# Patient Record
Sex: FEMALE | Race: WHITE | Employment: FULL TIME | URBAN - METROPOLITAN AREA
[De-identification: names, ages, dates, MRNs, and addresses within clinical notes are randomized per-mention and may not be internally consistent; named-entity substitution may affect disease eponyms.]

---

## 2022-03-09 ENCOUNTER — TRANSCRIBE ORDER (OUTPATIENT)
Dept: SCHEDULING | Age: 42
End: 2022-03-09

## 2022-03-09 DIAGNOSIS — Z12.31 ENCOUNTER FOR SCREENING MAMMOGRAM FOR MALIGNANT NEOPLASM OF BREAST: Primary | ICD-10-CM

## 2022-03-23 ENCOUNTER — HOSPITAL ENCOUNTER (OUTPATIENT)
Dept: MAMMOGRAPHY | Age: 42
Discharge: HOME | End: 2022-03-23
Attending: OBSTETRICS & GYNECOLOGY

## 2022-03-23 DIAGNOSIS — Z12.31 ENCOUNTER FOR SCREENING MAMMOGRAM FOR MALIGNANT NEOPLASM OF BREAST: ICD-10-CM

## 2022-03-31 ENCOUNTER — HOSPITAL ENCOUNTER (OUTPATIENT)
Dept: MAMMOGRAPHY | Age: 42
Discharge: HOME | End: 2022-03-31
Attending: OBSTETRICS & GYNECOLOGY
Payer: COMMERCIAL

## 2022-03-31 DIAGNOSIS — R92.8 ABNORMAL MAMMOGRAM: ICD-10-CM

## 2022-03-31 PROCEDURE — 77065 DX MAMMO INCL CAD UNI: CPT

## 2022-04-06 ENCOUNTER — HOSPITAL ENCOUNTER (OUTPATIENT)
Dept: MAMMOGRAPHY | Age: 42
Discharge: HOME | End: 2022-04-06
Attending: OBSTETRICS & GYNECOLOGY
Payer: COMMERCIAL

## 2022-04-06 VITALS
HEART RATE: 82 BPM | SYSTOLIC BLOOD PRESSURE: 125 MMHG | OXYGEN SATURATION: 100 % | DIASTOLIC BLOOD PRESSURE: 71 MMHG | TEMPERATURE: 97.1 F

## 2022-04-06 DIAGNOSIS — R92.8 ABNORMAL MAMMOGRAM OF RIGHT BREAST: ICD-10-CM

## 2022-04-06 PROCEDURE — 74011000250 HC RX REV CODE- 250: Performed by: OBSTETRICS & GYNECOLOGY

## 2022-04-06 PROCEDURE — 19081 BX BREAST 1ST LESION STRTCTC: CPT

## 2022-04-06 PROCEDURE — 74011250636 HC RX REV CODE- 250/636: Performed by: OBSTETRICS & GYNECOLOGY

## 2022-04-06 PROCEDURE — 88305 TISSUE EXAM BY PATHOLOGIST: CPT

## 2022-04-06 PROCEDURE — 77065 DX MAMMO INCL CAD UNI: CPT

## 2022-04-06 RX ORDER — LIDOCAINE HYDROCHLORIDE 10 MG/ML
5 INJECTION INFILTRATION; PERINEURAL
Status: COMPLETED | OUTPATIENT
Start: 2022-04-06 | End: 2022-04-06

## 2022-04-06 RX ORDER — LIDOCAINE HYDROCHLORIDE AND EPINEPHRINE 10; 10 MG/ML; UG/ML
10 INJECTION, SOLUTION INFILTRATION; PERINEURAL
Status: COMPLETED | OUTPATIENT
Start: 2022-04-06 | End: 2022-04-06

## 2022-04-06 RX ORDER — LIDOCAINE HYDROCHLORIDE AND EPINEPHRINE 10; 10 MG/ML; UG/ML
5 INJECTION, SOLUTION INFILTRATION; PERINEURAL
Status: COMPLETED | OUTPATIENT
Start: 2022-04-06 | End: 2022-04-06

## 2022-04-06 RX ADMIN — SODIUM CHLORIDE 250 ML: 900 INJECTION, SOLUTION INTRAVENOUS at 11:58

## 2022-04-06 RX ADMIN — LIDOCAINE HYDROCHLORIDE 3 ML: 10 INJECTION, SOLUTION INFILTRATION; PERINEURAL at 11:57

## 2022-04-06 RX ADMIN — LIDOCAINE HYDROCHLORIDE,EPINEPHRINE BITARTRATE 100 MG: 10; .01 INJECTION, SOLUTION INFILTRATION; PERINEURAL at 11:56

## 2022-04-06 RX ADMIN — LIDOCAINE HYDROCHLORIDE,EPINEPHRINE BITARTRATE 50 MG: 10; .01 INJECTION, SOLUTION INFILTRATION; PERINEURAL at 11:57

## 2022-04-14 NOTE — H&P (VIEW-ONLY)
Bridgette Bellamy MD, [facility], [address] [place] [address]  Mario Guzmán  Phone [phone]   Fax [phone]      Date of visit: 4/14/2022     Primary/Requesting provider: None    Chief Complaint   Patient presents with    New Patient     right breast ADH          HISTORY OF PRESENT ILLNESS  Jose Mehta is a 38 y.o. female. HPI  Patient is a 38 y.o. female who presents for discussion regarding her recent diagnosis of RIGHT ADH. She is asymptomatic. Her diagnosis was noted on her baseline mammogram a few weeks ago, prompting further evaluation. She has no personal history of breast concerns; her mother was diagnosed with stage 1 breast cancer 5 yrs ago at age 79. Medications:   No current outpatient medications on file. No current facility-administered medications for this visit. Allergies: No Known Allergies     Past History:  History reviewed. No pertinent past medical history.    Past Surgical History:   Procedure Laterality Date    HX BREAST BIOPSY Right 04/06/2022    Stereotactic Biopsy        Family and Social History:  Family History   Problem Relation Age of Onset    Breast Cancer Mother      Social History     Socioeconomic History    Marital status:      Spouse name: Not on file    Number of children: Not on file    Years of education: Not on file    Highest education level: Not on file   Occupational History    Not on file   Tobacco Use    Smoking status: Never Smoker    Smokeless tobacco: Never Used   Substance and Sexual Activity    Alcohol use: Not on file    Drug use: Not on file    Sexual activity: Not on file   Other Topics Concern    Not on file   Social History Narrative    Not on file     Social Determinants of Health     Financial Resource Strain:     Difficulty of Paying Living Expenses: Not on file   Food Insecurity:     Worried About Running Out of Food in the Last Year: Not on file    Alec of Food in the Last Year: Not on file   Transportation Needs:     Lack of Transportation (Medical): Not on file    Lack of Transportation (Non-Medical): Not on file   Physical Activity:     Days of Exercise per Week: Not on file    Minutes of Exercise per Session: Not on file   Stress:     Feeling of Stress : Not on file   Social Connections:     Frequency of Communication with Friends and Family: Not on file    Frequency of Social Gatherings with Friends and Family: Not on file    Attends Buddhist Services: Not on file    Active Member of [address] [place] [address] [employer] or Organizations: Not on file    Attends Club or Organization Meetings: Not on file    Marital Status: Not on file   Intimate Partner Violence:     Fear of Current or Ex-Partner: Not on file    Emotionally Abused: Not on file    Physically Abused: Not on file    Sexually Abused: Not on file   Housing Stability:     Unable to Pay for Housing in the Last Year: Not on file    Number of [place] in the Last Year: Not on file    Unstable Housing in the Last Year: Not on file       Review of Systems   Constitutional: Negative. HENT: Negative. Eyes: Negative. Respiratory: Negative. Cardiovascular: Negative. Gastrointestinal: Negative. Genitourinary: Negative. Musculoskeletal: Negative. Skin: Negative. Neurological: Negative. Endo/Heme/Allergies: Negative. Psychiatric/Behavioral: Negative. Physical Exam  Visit Vitals  BP (!) 128/94   Pulse 73   Ht 5' 8\" (1.727 m)   Wt 140 lb (63.5 kg)   LMP 04/11/2022   BMI 21.29 kg/m²     General Appearance: In no acute distress   Ears/Nose/Mouth/Throat:   Hearing grossly normal.         Neck: Supple. Chest:   Lungs clear to auscultation bilaterally. Cardiovascular:  Regular rate and rhythm, S1, S2 normal, no murmur. Abdomen:   Soft.    Extremities: No gross deformity    Neuro: alert and oriented x 3              BISI Results (most recent):  Results from Hospital Encounter encounter on 04/06/22    BISI BREAST RT SURG SPEC    Addendum 4/13/2022  2:31 PM  Addendum: Tolu Mendoza, accession number: [id number]  Date: 4/13/2022 Pathology was noted as A: Right breast calcifications at 9:00  position, core biopsy: Fibrocystic mastopathy having atypical ductal hyperplasia  with microcalcifications. See comment. Comment: Because ductal carcinoma in situ  is found in a small percentage of excisional biopsies following core biopsies  having atypical ductal hyperplasia, consideration should be given to excisional  biopsy, if clinically indicated. Results concordant with imaging. Pathology report was called to  Dr. Byron Hernandez office on 4/7/2022 by Marlyce Phoenix, RT([email or web address])(MONCHO). Patient was referred to Dr. Caron Bray and has an appointment scheduled with him on 4/14/2022. Narrative  Stereotactic Breast Biopsy    INDICATION: Developing calcification    PROCEDURE: After informed consent was obtained, the suspicious calcifications in  the lateral right breast were localized from a lateral approach. The overlying  skin was sterilely prepped and draped. 1% Lidocaine was used for superficial  anesthesia. Lidocaine with Epinephrine was used for deeper anesthesia. Using stereotactic guidance, an 9 gauge needle was advanced into the breast.  After good position of the needle was confirmed with stereotactic imaging, 2  samples were then obtained with vacuum assistance. A clip was left to jayne the  biopsy site. The needle was withdrawn and hemostasis was obtained. Patient  tolerated the procedure well. Specimen Radiograph:  Multiple calcifications are present in the stereotactic  specimen. Postprocedure Mammogram:  Postprocedure right mammograms show the biopsy clip in  expected location. Impression  Successful vacuum assisted stereotactic biopsy of calcifications in  the lateral right breast              ASSESSMENT and PLAN  Encounter Diagnoses   Name Primary?     Atypical ductal hyperplasia of right breast Yes    Family history of breast cancer in mother        Reviewed implications of ADH with pt and . If ADH is final diagnosis, referral to oncology to discuss risk reduction strategies will be recommended. Lumkpectomy/wide excision of the area recommended to r/o DCIS or invasive disease, found in a small percentage of cases. After discussion, we will proceed with  wire localized, right lumpectomy. We have discussed the technical details of the procedure(s) in detail. Risks reviewed include anesthetic risks, bleeding, infection, wound healing problems and cosmetic abnormalities, need for further surgical intervention depending on pathology reports. All questions are answered.

## 2022-04-25 VITALS — WEIGHT: 135 LBS | HEIGHT: 68 IN | BODY MASS INDEX: 20.46 KG/M2

## 2022-04-25 RX ORDER — MELATONIN
DAILY
COMMUNITY

## 2022-04-25 NOTE — PERIOP NOTES
Patient verified name and . Order for consent NOT found in EHR; patient verifies procedure. Type 1B surgery, phone assessment complete. Orders not received. Labs per surgeon: no orders received  Labs per anesthesia protocol: none    Patient answered medical/surgical history questions at their best of ability. All prior to admission medications documented in [facility]. Patient instructed to take the following medications the day of surgery according to anesthesia guidelines with a small sip of water: NONE. On the day before surgery please take Acetaminophen 1000mg in the morning and then again before bed. You may substitute for Tylenol 650 mg. Hold all vitamins, supplements, herbals 7 days prior to surgery and NSAIDS/ASA 5 days prior to surgery. Patient instructed on the following:    > Arrive at [facility], time of arrival to be called the day before by 1700  > NPO after midnight including gum, mints, and ice chips  > Responsible adult must drive patient to the hospital, stay during surgery, and patient will need supervision 24 hours after anesthesia  > Use anti-bacterial soap in shower the night before surgery and on the morning of surgery  > All piercings must be removed prior to arrival.    > Leave all valuables (money and jewelry) at home but bring insurance card and ID on DOS.   > You may be required to pay a deductible or co-pay on the day of your procedure. You can pre-pay by calling [phone] if your surgery is at the [facility] or [phone] if your surgery is at the [facility]. > Do not wear deodorant,  make-up, nail polish, lotions, cologne, perfumes, powders, or oil on skin. Artificial nails are not permitted.

## 2022-04-28 ENCOUNTER — HOSPITAL ENCOUNTER (OUTPATIENT)
Dept: SURGERY | Age: 42
Discharge: HOME | End: 2022-04-28

## 2022-05-04 ENCOUNTER — ANESTHESIA EVENT (OUTPATIENT)
Dept: SURGERY | Age: 42
End: 2022-05-04
Payer: COMMERCIAL

## 2022-05-05 ENCOUNTER — APPOINTMENT (OUTPATIENT)
Dept: MAMMOGRAPHY | Age: 42
End: 2022-05-05
Attending: SURGERY
Payer: COMMERCIAL

## 2022-05-05 ENCOUNTER — ANESTHESIA (OUTPATIENT)
Dept: SURGERY | Age: 42
End: 2022-05-05
Payer: COMMERCIAL

## 2022-05-05 ENCOUNTER — HOSPITAL ENCOUNTER (OUTPATIENT)
Age: 42
Setting detail: OUTPATIENT SURGERY
Discharge: HOME | End: 2022-05-05
Attending: SURGERY | Admitting: SURGERY
Payer: COMMERCIAL

## 2022-05-05 VITALS
HEART RATE: 78 BPM | WEIGHT: 139.8 LBS | RESPIRATION RATE: 16 BRPM | SYSTOLIC BLOOD PRESSURE: 132 MMHG | OXYGEN SATURATION: 100 % | DIASTOLIC BLOOD PRESSURE: 78 MMHG | BODY MASS INDEX: 21.19 KG/M2 | TEMPERATURE: 97.5 F | HEIGHT: 68 IN

## 2022-05-05 DIAGNOSIS — Z80.3 FAMILY HISTORY OF BREAST CANCER IN MOTHER: ICD-10-CM

## 2022-05-05 DIAGNOSIS — C50.919 BREAST CANCER (HCC): ICD-10-CM

## 2022-05-05 DIAGNOSIS — N60.91 ATYPICAL DUCTAL HYPERPLASIA OF RIGHT BREAST: ICD-10-CM

## 2022-05-05 LAB — HCG UR QL: NEGATIVE

## 2022-05-05 PROCEDURE — 74011000250 HC RX REV CODE- 250: Performed by: SURGERY

## 2022-05-05 PROCEDURE — 74011250636 HC RX REV CODE- 250/636: Performed by: ANESTHESIOLOGY

## 2022-05-05 PROCEDURE — 81025 URINE PREGNANCY TEST: CPT

## 2022-05-05 PROCEDURE — 19125 EXCISION BREAST LESION: CPT | Performed by: SURGERY

## 2022-05-05 PROCEDURE — 77030002996 HC SUT SLK J&J -A: Performed by: SURGERY

## 2022-05-05 PROCEDURE — 88307 TISSUE EXAM BY PATHOLOGIST: CPT

## 2022-05-05 PROCEDURE — 74011000250 HC RX REV CODE- 250: Performed by: NURSE ANESTHETIST, CERTIFIED REGISTERED

## 2022-05-05 PROCEDURE — C1819 TISSUE LOCALIZATION-EXCISION: HCPCS

## 2022-05-05 PROCEDURE — 74011250636 HC RX REV CODE- 250/636: Performed by: NURSE ANESTHETIST, CERTIFIED REGISTERED

## 2022-05-05 PROCEDURE — 76060000033 HC ANESTHESIA 1 TO 1.5 HR: Performed by: SURGERY

## 2022-05-05 PROCEDURE — 76010000138 HC OR TIME 0.5 TO 1 HR: Performed by: SURGERY

## 2022-05-05 PROCEDURE — 77030040361 HC SLV COMPR DVT MDII -B: Performed by: SURGERY

## 2022-05-05 PROCEDURE — 2709999900 HC NON-CHARGEABLE SUPPLY: Performed by: SURGERY

## 2022-05-05 PROCEDURE — 77030010509 HC AIRWY LMA MSK TELE -A: Performed by: ANESTHESIOLOGY

## 2022-05-05 PROCEDURE — 77030031139 HC SUT VCRL2 J&J -A: Performed by: SURGERY

## 2022-05-05 PROCEDURE — 76210000020 HC REC RM PH II FIRST 0.5 HR: Performed by: SURGERY

## 2022-05-05 PROCEDURE — 74011250636 HC RX REV CODE- 250/636: Performed by: SURGERY

## 2022-05-05 PROCEDURE — 77030010512 HC APPL CLP LIG J&J -C: Performed by: SURGERY

## 2022-05-05 PROCEDURE — 76210000006 HC OR PH I REC 0.5 TO 1 HR: Performed by: SURGERY

## 2022-05-05 PROCEDURE — 77030031304 HC WAVWGD EIGR DISP INVO -D: Performed by: SURGERY

## 2022-05-05 PROCEDURE — 74011250637 HC RX REV CODE- 250/637: Performed by: ANESTHESIOLOGY

## 2022-05-05 RX ORDER — HYDROCODONE BITARTRATE AND ACETAMINOPHEN 5; 325 MG/1; MG/1
1 TABLET ORAL
Qty: 10 TABLET | Refills: 0 | Status: SHIPPED | OUTPATIENT
Start: 2022-05-05 | End: 2022-05-10

## 2022-05-05 RX ORDER — SODIUM CHLORIDE, SODIUM LACTATE, POTASSIUM CHLORIDE, CALCIUM CHLORIDE 600; 310; 30; 20 MG/100ML; MG/100ML; MG/100ML; MG/100ML
75 INJECTION, SOLUTION INTRAVENOUS CONTINUOUS
Status: DISCONTINUED | OUTPATIENT
Start: 2022-05-05 | End: 2022-05-05 | Stop reason: HOSPADM

## 2022-05-05 RX ORDER — FENTANYL CITRATE 50 UG/ML
INJECTION, SOLUTION INTRAMUSCULAR; INTRAVENOUS AS NEEDED
Status: DISCONTINUED | OUTPATIENT
Start: 2022-05-05 | End: 2022-05-05 | Stop reason: HOSPADM

## 2022-05-05 RX ORDER — CEFAZOLIN SODIUM/WATER 2 G/20 ML
2 SYRINGE (ML) INTRAVENOUS
Status: COMPLETED | OUTPATIENT
Start: 2022-05-05 | End: 2022-05-05

## 2022-05-05 RX ORDER — LIDOCAINE HYDROCHLORIDE 20 MG/ML
INJECTION, SOLUTION EPIDURAL; INFILTRATION; INTRACAUDAL; PERINEURAL AS NEEDED
Status: DISCONTINUED | OUTPATIENT
Start: 2022-05-05 | End: 2022-05-05 | Stop reason: HOSPADM

## 2022-05-05 RX ORDER — OXYCODONE AND ACETAMINOPHEN 5; 325 MG/1; MG/1
1 TABLET ORAL AS NEEDED
Status: DISCONTINUED | OUTPATIENT
Start: 2022-05-05 | End: 2022-05-05 | Stop reason: HOSPADM

## 2022-05-05 RX ORDER — MIDAZOLAM HYDROCHLORIDE 1 MG/ML
2 INJECTION, SOLUTION INTRAMUSCULAR; INTRAVENOUS
Status: DISCONTINUED | OUTPATIENT
Start: 2022-05-05 | End: 2022-05-05 | Stop reason: HOSPADM

## 2022-05-05 RX ORDER — PROPOFOL 10 MG/ML
INJECTION, EMULSION INTRAVENOUS AS NEEDED
Status: DISCONTINUED | OUTPATIENT
Start: 2022-05-05 | End: 2022-05-05 | Stop reason: HOSPADM

## 2022-05-05 RX ORDER — LIDOCAINE HYDROCHLORIDE 10 MG/ML
0.1 INJECTION INFILTRATION; PERINEURAL AS NEEDED
Status: DISCONTINUED | OUTPATIENT
Start: 2022-05-05 | End: 2022-05-05 | Stop reason: HOSPADM

## 2022-05-05 RX ORDER — LIDOCAINE HYDROCHLORIDE 10 MG/ML
5 INJECTION INFILTRATION; PERINEURAL
Status: COMPLETED | OUTPATIENT
Start: 2022-05-05 | End: 2022-05-05

## 2022-05-05 RX ORDER — ACETAMINOPHEN 500 MG
1000 TABLET ORAL ONCE
Status: COMPLETED | OUTPATIENT
Start: 2022-05-05 | End: 2022-05-05

## 2022-05-05 RX ORDER — BUPIVACAINE HYDROCHLORIDE 2.5 MG/ML
INJECTION, SOLUTION EPIDURAL; INFILTRATION; INTRACAUDAL AS NEEDED
Status: DISCONTINUED | OUTPATIENT
Start: 2022-05-05 | End: 2022-05-05 | Stop reason: HOSPADM

## 2022-05-05 RX ORDER — DEXAMETHASONE SODIUM PHOSPHATE 100 MG/10ML
INJECTION INTRAMUSCULAR; INTRAVENOUS AS NEEDED
Status: DISCONTINUED | OUTPATIENT
Start: 2022-05-05 | End: 2022-05-05 | Stop reason: HOSPADM

## 2022-05-05 RX ORDER — NALOXONE HYDROCHLORIDE 0.4 MG/ML
0.2 INJECTION, SOLUTION INTRAMUSCULAR; INTRAVENOUS; SUBCUTANEOUS AS NEEDED
Status: DISCONTINUED | OUTPATIENT
Start: 2022-05-05 | End: 2022-05-05 | Stop reason: HOSPADM

## 2022-05-05 RX ORDER — SODIUM CHLORIDE 0.9 % (FLUSH) 0.9 %
5-40 SYRINGE (ML) INJECTION EVERY 8 HOURS
Status: DISCONTINUED | OUTPATIENT
Start: 2022-05-05 | End: 2022-05-05 | Stop reason: HOSPADM

## 2022-05-05 RX ORDER — ONDANSETRON 2 MG/ML
INJECTION INTRAMUSCULAR; INTRAVENOUS AS NEEDED
Status: DISCONTINUED | OUTPATIENT
Start: 2022-05-05 | End: 2022-05-05 | Stop reason: HOSPADM

## 2022-05-05 RX ORDER — MIDAZOLAM HYDROCHLORIDE 1 MG/ML
INJECTION, SOLUTION INTRAMUSCULAR; INTRAVENOUS AS NEEDED
Status: DISCONTINUED | OUTPATIENT
Start: 2022-05-05 | End: 2022-05-05 | Stop reason: HOSPADM

## 2022-05-05 RX ORDER — SODIUM CHLORIDE 0.9 % (FLUSH) 0.9 %
5-40 SYRINGE (ML) INJECTION AS NEEDED
Status: DISCONTINUED | OUTPATIENT
Start: 2022-05-05 | End: 2022-05-05 | Stop reason: HOSPADM

## 2022-05-05 RX ORDER — HYDROMORPHONE HYDROCHLORIDE 2 MG/ML
0.5 INJECTION, SOLUTION INTRAMUSCULAR; INTRAVENOUS; SUBCUTANEOUS
Status: DISCONTINUED | OUTPATIENT
Start: 2022-05-05 | End: 2022-05-05 | Stop reason: HOSPADM

## 2022-05-05 RX ORDER — EPHEDRINE SULFATE/0.9% NACL/PF 50 MG/5 ML
SYRINGE (ML) INTRAVENOUS AS NEEDED
Status: DISCONTINUED | OUTPATIENT
Start: 2022-05-05 | End: 2022-05-05 | Stop reason: HOSPADM

## 2022-05-05 RX ADMIN — DEXAMETHASONE SODIUM PHOSPHATE 5 MG: 10 INJECTION INTRAMUSCULAR; INTRAVENOUS at 13:31

## 2022-05-05 RX ADMIN — LIDOCAINE HYDROCHLORIDE 2 ML: 10 INJECTION, SOLUTION INFILTRATION; PERINEURAL at 11:52

## 2022-05-05 RX ADMIN — Medication 10 MG: at 13:47

## 2022-05-05 RX ADMIN — MIDAZOLAM 2 MG: 1 INJECTION INTRAMUSCULAR; INTRAVENOUS at 13:07

## 2022-05-05 RX ADMIN — SODIUM CHLORIDE, SODIUM LACTATE, POTASSIUM CHLORIDE, AND CALCIUM CHLORIDE: 600; 310; 30; 20 INJECTION, SOLUTION INTRAVENOUS at 13:59

## 2022-05-05 RX ADMIN — FENTANYL CITRATE 50 MCG: 50 INJECTION INTRAMUSCULAR; INTRAVENOUS at 13:30

## 2022-05-05 RX ADMIN — PROPOFOL 200 MG: 10 INJECTION, EMULSION INTRAVENOUS at 13:23

## 2022-05-05 RX ADMIN — LIDOCAINE HYDROCHLORIDE 60 MG: 20 INJECTION, SOLUTION EPIDURAL; INFILTRATION; INTRACAUDAL; PERINEURAL at 13:23

## 2022-05-05 RX ADMIN — Medication 10 MG: at 13:59

## 2022-05-05 RX ADMIN — ONDANSETRON 4 MG: 2 INJECTION INTRAMUSCULAR; INTRAVENOUS at 13:23

## 2022-05-05 RX ADMIN — SODIUM CHLORIDE, SODIUM LACTATE, POTASSIUM CHLORIDE, AND CALCIUM CHLORIDE 75 ML/HR: 600; 310; 30; 20 INJECTION, SOLUTION INTRAVENOUS at 11:00

## 2022-05-05 RX ADMIN — FENTANYL CITRATE 25 MCG: 50 INJECTION INTRAMUSCULAR; INTRAVENOUS at 13:38

## 2022-05-05 RX ADMIN — Medication 2 G: at 13:15

## 2022-05-05 RX ADMIN — ACETAMINOPHEN 1000 MG: 500 TABLET, FILM COATED ORAL at 10:25

## 2022-05-05 RX ADMIN — FENTANYL CITRATE 25 MCG: 50 INJECTION INTRAMUSCULAR; INTRAVENOUS at 13:39

## 2022-05-05 RX ADMIN — OXYCODONE HYDROCHLORIDE AND ACETAMINOPHEN 1 TABLET: 5; 325 TABLET ORAL at 14:21

## 2022-05-05 NOTE — ANESTHESIA PREPROCEDURE EVALUATION
Relevant Problems   No relevant active problems       Anesthetic History   No history of anesthetic complications            Review of Systems / Medical History  Patient summary reviewed and pertinent labs reviewed    Pulmonary  Within defined limits                 Neuro/Psych   Within defined limits           Cardiovascular                  Exercise tolerance: >4 METS     GI/Hepatic/Renal  Within defined limits              Endo/Other  Within defined limits           Other Findings              Physical Exam    Airway  Mallampati: I  TM Distance: 4 - 6 cm  Neck ROM: normal range of motion   Mouth opening: Normal     Cardiovascular    Rhythm: regular  Rate: normal         Dental  No notable dental hx       Pulmonary  Breath sounds clear to auscultation               Abdominal  GI exam deferred       Other Findings            Anesthetic Plan    ASA: 2  Anesthesia type: general          Induction: Intravenous  Anesthetic plan and risks discussed with: Patient

## 2022-05-05 NOTE — ANESTHESIA POSTPROCEDURE EVALUATION
Procedure(s):  RIGHT BREAST LUMPECTOMY/PARTIAL  RIGHT BREAST NEEDLE LOCALIZED BIOPSY.     general    Anesthesia Post Evaluation      Multimodal analgesia: multimodal analgesia used between 6 hours prior to anesthesia start to PACU discharge  Patient location during evaluation: PACU  Patient participation: complete - patient participated  Level of consciousness: awake and alert  Pain management: adequate  Airway patency: patent  Anesthetic complications: no  Cardiovascular status: acceptable  Respiratory status: acceptable  Hydration status: acceptable  Post anesthesia nausea and vomiting:  none  Final Post Anesthesia Temperature Assessment:  Normothermia (36.0-37.5 degrees C)      INITIAL Post-op Vital signs:   Vitals Value Taken Time   /68 05/05/22 1434   Temp 36.4 °C (97.5 °F) 05/05/22 1414   Pulse 80 05/05/22 1434   Resp 16 05/05/22 1434   SpO2 100 % 05/05/22 1434

## 2022-05-05 NOTE — DISCHARGE INSTRUCTIONS
Carolann Nolan M.D.  [phone]    Instructions following Breast Surgery (lumpectomy, partial mastectomy)    ACTIVITY:   Try to take a few short walks with help around the house later today. It is very important to take short walks to avoid blood clots and pneumonia.  You may be light-headed or sleepy from anesthesia, so be careful going up and down stairs.  Avoid any activity that involves lifting your operative-side arm above your head until your follow-up appointment. We suggest wearing a tight-fitting bra (with or without a wire) continuously for the next 48 hours to minimize motion of the breast.    DIET:   Start with clear, non-carbonated liquids when you get home (sugar-free if you are diabetic), such as Gatorade, chicken broth, etc., and you may advance to regular foods as you feel able. PAIN:   You will be given a prescription for pain medication.  Try to take the pain medication with food, even a few crackers.  You may also use Tylenol, Motrin, Advil, or Aleve instead of the prescription pain medication. Do no take Tylenol and the prescription pain medication within 3 hours of each other.  URINARY RETENTION: If you are unable to empty your bladder within 6-8 hours after returning home, please go to your nearest Emergency Department or Urgent Care for urinary catheterization. WOUND CARE:   Please keep the dressing dry and intact for 3 days after surgery. You may then remove the tape and gauze;  at this time it is fine to get the incision wet,  The steri-strips will probably remain on your skin for several more days.  You may bathe prior to removing the dressing as long as the dressing remains dry   It is not uncommon for the breast to have a bruised appearance in the region of the surgery; this will clear over several days.  Incisions will sometimes develop redness around them as well as a hard lumpy feel.  If this area of redness continues to get larger, please call the office. FOLLOW UP:   Your follow-up appointment is usually made when your surgery is arranged. Please call the office if you are not sure of this appointment. CALL THE DOCTOR IF:   You have a temperature higher than 101.5° Fahrenheit for more than 6 hours.  You have severe nausea or vomiting.  You develop increasing redness or signs of infection at the incision. Continue home medications as previously prescribed. MEDICATION INTERACTION:  During your procedure you potentially received a medication or medications which may reduce the effectiveness of oral contraceptives. Please consider other forms of contraception for 1 month following your procedure if you are currently using oral contraceptives as your primary form of birth control. In addition to this, we recommend continuing your oral contraceptive as prescribed, unless otherwise instructed by your physician, during this time    After general anesthesia or intravenous sedation, for 24 hours or while taking prescription Narcotics:  · Limit your activities  · A responsible adult needs to be with you for the next 24 hours  · Do not drive and operate hazardous machinery  · Do not make important personal or business decisions  · Do not drink alcoholic beverages  · If you have not urinated within 8 hours after discharge, please contact your surgeon on call. · If you have sleep apnea and have a CPAP machine, please use it for all naps and sleeping. · Please use caution when taking narcotics and any of your home medications that may cause drowsiness. *  Please give a list of your current medications to your Primary Care Provider. *  Please update this list whenever your medications are discontinued, doses are      changed, or new medications (including over-the-counter products) are added. *  Please carry medication information at all times in case of emergency situations.     These are general instructions for a healthy lifestyle:  No smoking/ No tobacco products/ Avoid exposure to second hand smoke  Surgeon General's Warning:  Quitting smoking now greatly reduces serious risk to your health. Obesity, smoking, and sedentary lifestyle greatly increases your risk for illness  A healthy diet, regular physical exercise & weight monitoring are important for maintaining a healthy lifestyle    You may be retaining fluid if you have a history of heart failure or if you experience any of the following symptoms:  Weight gain of 3 pounds or more overnight or 5 pounds in a week, increased swelling in our hands or feet or shortness of breath while lying flat in bed. Please call your doctor as soon as you notice any of these symptoms; do not wait until your next office visit.

## 2022-05-05 NOTE — INTERVAL H&P NOTE
Update History & Physical    The Patient's History and Physical was reviewed with the patient and I examined the patient. There was no change. The surgical site was confirmed by the patient and me. Plan:  The risk, benefits, expected outcome, and alternative to the recommended procedure have been discussed with the patient. Patient understands and wants to proceed with the procedure.     Electronically signed by Claudia Lawson MD on 5/5/2022 at 1:06 PM

## 2022-05-05 NOTE — OP NOTES
Operative Note    Date of Surgery: 5/5/2022    Preoperative Diagnosis: Benign mammary dysplasia of right breast [N60.91]     Postoperative Diagnosis: Benign mammary dysplasia of right breast [N60.91]     Surgeon(s) and Role:  Jose Ramon Hyman MD - Primary      Anesthesia: General    Estimated Blood Loss: <10cc    Procedure:   Procedure(s):  RIGHT BREAST LUMPECTOMY/PARTIAL  RIGHT BREAST NEEDLE LOCALIZED BIOPSY    Indications:  As detailed in the H&P. Procedure in Detail:  Prior to the operative procedure,  Rosalba Ernst underwent wire localization to the Right breast in radiology. Films were reviewed. The patient was then brought to the operating room and placed on the table in a supine position with adequate padding to all pressure points and the arm extended laterally. After induction of anesthesia, the operative site was prepped and draped in the usual fashion. A radial, 7:00 incision was made in the area of the wire. This was carried down into the subcutaneous level and  small superior and inferior flaps were created sharply. Dissection then extended down the wire to the depth of interest where the area was excised circumferentially, marked in the usual manner, and was imaged for evaluation and confirmation of wire removal and clip capture, with calcs confirmed in the specimen by radiology. The cavity was irrigated and  hemostasis was achieved with cautery. The area was infiltrated with local and closed with interrupted subcutaneous 3-0 Vicryl and 4-0 Vicryl subcuticular sutures. Steri-Strips were applied to the wound. A gauze pressure dressing was applied to the breast. Sponge and needle counts were correct times two. The patient tolerated the procedure well and was transferred to recovery room in stable condition.                Specimens:   ID Type Source Tests Collected by Time Destination   1 : Right breast lumpectomy Fresh Breast  Chantal Stevens MD 5/5/2022 8117 Pathology Signed By: Allegra Benjamin MD     May 5, 2022

## 2022-07-19 NOTE — PROGRESS NOTES
New Patient Abstract    Reason for Referral: right breast ADH    Referring Provider:  Ingrid Fraser MD    Primary Care Provider: Not on file    Family History of Cancer/Hematologic Disorders: Mother with breast cancer    Presenting Symptoms: abnormal screening mammogram    Narrative with recent with Results/Procedures/Biopsies and Dates completed:   Ms. Pretty Vogt is a 57-year-old  female who reports to have never used tobacco products. She repots use of alcohol and drug substance use is not on file. Her medical history is not on file. Her surgical history reports as right breast biopsy and right breast lumpectomy. In March 2022, Ms. Pretty Vogt had her baseline mammogram that reported a calcification in her right breast. On 3/31/22 she had a right breast diagnostic mammogram that reported a right breast indeterminate calcification. On 4/6/22 she underwent a core needle biopsy of the right breast. The pathology reported fibrocystic mastopathy having atypical ductal hyperplasia with microcalcifications. On 4/14/22, Ms. Pretty Vogt saw Dr. Radha Shaikh for surgical consult. The recommendation was for right breast lumpectomy. On 5/5/22 she underwent a right breast partial lumpectomy. The pathology reported benign breast tissue with features of fibrocystic mastopathy. A referral was placed to medical oncology for consideration of risk reduction strategies given her no residual ADH however history of mother with breast cancer. 3/24/22 Bilateral Digital Screening Mammogram (Initial/baseline)  Impression   Calcification in the posterior lateral right breast.  Magnification   views recommended     3/31/22 Right Breast Diagnostic Mammogram  FINDINGS:        Mammogram: Digital diagnostic mammography was performed, and is interpreted in   conjunction with a computer assisted detection (CAD) system. Additional right mediolateral and magnification views were performed.  In the   posterior lateral breast at 9:00 6 cm from nipple there is a subcentimeter   grouping of pleomorphic calcifications with suggestion of some fine linear and   branching elements, of moderate suspicion. Biopsy is warranted. Elsewhere   scattered in the right breast are a few tiny faint typically benign   calcifications. No additional abnormal cluster is seen, and there is no evidence   of a discrete mass or architectural distortion. Impression   Right posterior 9:00 indeterminate calcifications. 4/6/22 Surgical Pathology  DIAGNOSIS       A:  \"RIGHT BREAST CALCIFICATIONS AT 9:00 POSITION, CORE BIOPSY\":        FIBROCYSTIC MASTOPATHY HAVING ATYPICAL DUCTAL HYPERPLASIA WITH   MICROCALCIFICATIONS. SEE COMMENT. COMMENT:  BECAUSE DUCTAL CARCINOMA IN SITU IS FOUND IN A SMALL PERCENTAGE   OF EXCISIONAL BIOPSIES FOLLOWING CORE BIOPSIES HAVING ATYPICAL DUCTAL   HYPERPLASIA, CONSIDERATION SHOULD BE GIVEN TO EXCISIONAL BIOPSY, IF   CLINICALLY INDICATED.    5/5/22 Surgical Pathology  DIAGNOSIS          \"RIGHT BREAST LUMPECTOMY\":             BENIGN BREAST TISSUE WITH FEATURES OF FIBROCYSTIC MASTOPATHY. BIOPSY SITE.     Notes from Referring Provider: n/a    Other Pertinent Information: n/a    Presented at Tumor Board: Yes, Dr Garrett Potter on 5/12/22

## 2022-08-23 ENCOUNTER — OFFICE VISIT (OUTPATIENT)
Dept: ONCOLOGY | Age: 42
End: 2022-08-23
Payer: COMMERCIAL

## 2022-08-23 VITALS
HEIGHT: 68 IN | WEIGHT: 143.6 LBS | SYSTOLIC BLOOD PRESSURE: 118 MMHG | HEART RATE: 67 BPM | OXYGEN SATURATION: 98 % | RESPIRATION RATE: 22 BRPM | BODY MASS INDEX: 21.76 KG/M2 | DIASTOLIC BLOOD PRESSURE: 74 MMHG | TEMPERATURE: 98.8 F

## 2022-08-23 DIAGNOSIS — N60.91 ATYPICAL DUCTAL HYPERPLASIA OF RIGHT BREAST: Primary | ICD-10-CM

## 2022-08-23 DIAGNOSIS — Z80.3 FAMILY HISTORY OF BREAST CANCER: ICD-10-CM

## 2022-08-23 PROCEDURE — 99203 OFFICE O/P NEW LOW 30 MIN: CPT | Performed by: INTERNAL MEDICINE

## 2022-08-23 ASSESSMENT — PATIENT HEALTH QUESTIONNAIRE - PHQ9
SUM OF ALL RESPONSES TO PHQ QUESTIONS 1-9: 0
SUM OF ALL RESPONSES TO PHQ9 QUESTIONS 1 & 2: 0
SUM OF ALL RESPONSES TO PHQ QUESTIONS 1-9: 0
2. FEELING DOWN, DEPRESSED OR HOPELESS: 0
1. LITTLE INTEREST OR PLEASURE IN DOING THINGS: 0

## 2022-08-23 NOTE — PROGRESS NOTES
[facility]: Office Visit New Patient H & P    Chief Complaint:    Chief Complaint   Patient presents with    New Patient         History of Present Illness:  Ms. Neha Kaba is a 38 y.o. female who presents today for evaluation regarding ADH and breast cancer risk reduction. In March 2022, Ms. Neha Kaba had her first mammogram for baseline, that reported a calcification in her right breast. On 3/31/22 she had a right breast diagnostic mammogram that confirmed an indeterminate calcification. On 4/6/22 she underwent a core needle biopsy of the right breast. The pathology reported fibrocystic mastopathy having atypical ductal hyperplasia with microcalcifications. On 4/14/22, Ms. Neha Kaba saw Dr. Cheyenne Khan for surgical consult. The recommendation was for right breast lumpectomy. On 5/5/22 she underwent a right breast partial lumpectomy. The pathology reported benign breast tissue with features of fibrocystic mastopathy, no residual ADH. A referral was placed to medical oncology for consideration of risk reduction strategies. Of note, she has a family history of a mother with post-menopausal breast cancer, she apparently has just completed her 5 years of endocrine therapy and remains ARACELI. Also of note, Ms. Neha Kaba is currently undergoing fertility treatments in order to conceive. She feels well with no complaints currently. Review of Systems:  Constitutional: Negative. HENT: Negative. Eyes: Negative. Respiratory: Negative. Cardiovascular: Negative. Gastrointestinal: Negative. Genitourinary: Negative. Musculoskeletal: Negative. Skin: Negative. Neurological: Negative. Endo/Heme/Allergies: Negative. Psychiatric/Behavioral: Negative. All other systems reviewed and are negative. Not on File  No past medical history on file.   Past Surgical History:   Procedure Laterality Date    BREAST BIOPSY Right 04/06/2022    Stereotactic Biopsy    BREAST BIOPSY Right 5/5/2022    RIGHT BREAST NEEDLE LOCALIZED BIOPSY performed by Francis Dang MD at [address] [facility] LUMPECTOMY Right 5/5/2022    RIGHT BREAST LUMPECTOMY/PARTIAL performed by Francis Dang MD at [address] [facility] LUMPECTOMY Right 05/05/2022    ADH    SHAYLEE STEROTACTIC LOC BREAST BIOPSY RIGHT Right 04/06/2022    SHAYLEE STEROTACTIC LOC BREAST BIOPSY RIGHT 4/6/2022 [facility]    SINUS SURGERY Bilateral      Family History   Problem Relation Age of Onset    Breast Cancer Mother      Social History     Socioeconomic History    Marital status:      Spouse name: Not on file    Number of children: Not on file    Years of education: Not on file    Highest education level: Not on file   Occupational History    Not on file   Tobacco Use    Smoking status: Never    Smokeless tobacco: Never   Substance and Sexual Activity    Alcohol use: Yes    Drug use: Not on file    Sexual activity: Not on file   Other Topics Concern    Not on file   Social History Narrative    Not on file     Social Determinants of Health     Financial Resource Strain: Not on file   Food Insecurity: Not on file   Transportation Needs: Not on file   Physical Activity: Not on file   Stress: Not on file   Social Connections: Not on file   Intimate Partner Violence: Not on file   Housing Stability: Not on file     No current outpatient medications on file. No current facility-administered medications for this visit. OBJECTIVE:  /74   Pulse 67   Temp 98.8 °F (37.1 °C) (Oral)   Resp 22   Ht 5' 7.5\" (1.715 m)   Wt 143 lb 9.6 oz (65.1 kg)   SpO2 98%   BMI 22.16 kg/m²     Physical Exam:  Constitutional: Well developed, well nourished female in no acute distress, sitting comfortably on the examination table. HEENT: Normocephalic and atraumatic. Sclerae anicteric. Skin Warm and dry. No bruising and no rash noted. No erythema. No pallor. Cardiopulmonary Deferred. Neuro Grossly nonfocal with no obvious sensory or motor deficits. MSK Normal range of motion in general.  No edema and no tenderness. Psych Appropriate mood and affect. Labs:  No results found for this or any previous visit (from the past 24 hour(s)). Imaging:  No results found. ASSESSMENT:   Diagnosis Orders   1. Atypical ductal hyperplasia of right breast        2. Family history of breast cancer          Patient Active Problem List   Diagnosis    Atypical ductal hyperplasia of right breast    Family history of breast cancer             PLAN:  Pertinent old records were reviewed. ADH: discovered on routine mammogram March 2022 followed by core biopsy, lumpectomy showed no residual ADH and no carcinoma. The diagnosis and natural history of atypical ductal hyperplasia was discussed with the patient. She understands that ADH, while a risk factor for development of cancer, is not technically a premalignant lesion. Excisional biopsy is done to exclude invasive or in situ carcinoma, but there is no risk of recurrent diease. She was also counseled that although the prognosis of ADH is generally quite favorable, there is a higher risk of a subsequent invasive breast cancer when compared to the general population. Therefore, risk reduction is a worthwhile consideration. Specifically, risk of recurrence can be reduced by antiestrogen therapy, such as tamoxifen, which carries a small but real risk of endometrial hyperplasia which can progress to endometrial cancer, as well as a risk of deep venous thrombosis, as well as menopausal-type symptoms. Since she is undergoing fertility treatments with an intent to conceive, she is not eligible for chemoprevention at this time. However, with her mother's history of breast cancer and her personal history of ADH, she is at elevated lifetime risk of breast cancer, and increased intensity screening (such as MRI) would be appropriate. After completion of child bearing she will discuss this with her OBGYN.   She will call with any future oncologic concerns. All questions were asked and answered to the best of my ability. In all, I spent 30 minutes in the care of Ms. Higinio De León today, over 50% of which was in direct counseling and coordination of care.           Sierra Lemus MD, MD  [facility]  [address] [place] [address]  Office : [phone]  Fax : [phone]

## 2022-08-23 NOTE — PATIENT INSTRUCTIONS
Patient Instructions from Today's Visit    Reason for Visit:  Follow up- Atypical Ductal Hyperplasia    Diagnosis Information:  https://www.Nexess.CloudFlare/. net/about-us/asco-answers-patient-education-materials/nmql-ltamzya-mtch-sheets  ASCO ANSWERS is a collection of oncologist-approved patient education materials developed by the [employer] of Clinical Oncology (ASCO) for people with cancer and their caregivers. Breast Cancer    What is breast cancer? Breast cancer begins when healthy breast cells change and grow out of control, usually forming a mass called a tumor. Breast cancer is the most common type of cancer diagnosed in women in the [facility] (excluding skin cancer). What are the parts of the breast?   Most of the breast is fatty tissue. However, it also contains a network of lobes that are made up of tiny, tube-like structures called lobules that contain milk glands. Tiny ducts connect the glands, lobules, and lobes, and carry milk from the lobes to the nipple. Most breast cancers begin in the cells lining the milk ducts and are called ductal carcinomas. The second most common type starts in the lobules and is called lobular carcinoma. What does stage mean? The stage is a way of describing where the cancer is located, how much the cancer has grown, and if or where it has spread. There are 5 stages for breast cancer: stage 0 (zero), which is called noninvasive cancer or ductal carcinoma in situ (DCIS), and stages I through IV (1 through 4). Descriptions and illustrations of these stages are available at www.cancer. net/breast.     How is breast cancer treated? The biology and behavior of a breast cancer affect the treatment plan, and every persons cancer is different.  Doctors consider many factors when recommending a treatment plan, including the cancers stage; the tumors human epidermal growth factor receptor 2 (HER2) status and the hormone receptor status, which includes estrogen receptors (ER) and progesterone receptors (OR); the presence of known mutations (changes) in breast cancer genes; and the womans age, general health, and whether she has experienced menopause. For earlier stages of cancer, surgery to remove the tumor and nearby lymph nodes usually is the first treatment. Additional treatment with chemotherapy, radiation therapy, hormonal therapy, or targeted therapy is usually given after surgery to lower the risk of the cancer returning. These treatments may also be given before surgery to shrink the size of the tumor. The treatment of cancer that has spread or come back after treatment depends on many factors. It can include the therapies listed above used in a different combination or at a different pace. When making treatment decisions, women may also consider a clinical trial; talk with your doctor about all treatment options. The side effects of breast cancer treatment can be reduced or managed with a variety of medications and the help of your health care team. This is called palliative care and is an important part of the overall treatment plan. How can I cope with breast cancer? Absorbing the news of a cancer diagnosis and communicating with your health care team are key parts of the coping process. Seeking support, organizing your health information, making sure all of your questions are answered, and participating in the decision-making process are other steps. Talk with your health care team about any concerns. Understanding your emotions and those of people close to you can be helpful in managing the diagnosis, treatment, and healing process. [address]. © 2004 AMERICAN SOCIETY OF CLINICAL ONCOLOGY. MADE AVAILABLE THROUGH   Questions to ask the health care team   Regular communication is important in making informed decisions about your health care.  Consider asking the following questions of your health care team: What type of breast cancer do I have? Can you explain my pathology report (laboratory test results) to me? What stage is the breast cancer? What does this mean? What is the ER/NJ status of the tumor? The HER2 status? What does this                       mean? Would you explain my treatment options? What clinical trials are available for me? Where are they located, and how do I                 find out more about them? What treatment plan do you recommend? Why? Should treatment before surgery be considered? What is the goal of each treatment? Is it to eliminate the cancer, help me feel                   better, or both? Who will be part of my treatment team, and what does each member do? How will this treatment affect my daily life? Will I be able to work, exercise, and                perform my usual activities? Will this treatment affect my ability to become pregnant or have children? What                can be done to preserve my fertility? What long-term side effects are associated with my cancer treatment? If Im worried about managing the costs of cancer care, who can help me? Where can I find emotional support for me and my family? Whom should I call with questions or problems? Is there anything else I should be asking? Find more questions to ask the health care team at [address]. net/breast and www.cancer. net/metastaticbreast. For a digital list of questions, download Cancer. Nets free mobile steffen at www.cancer. net/steffen. The ideas and opinions expressed here do not necessarily reflect the opinions of the American Society of Clinical Oncology (ASCO) or The [employer]. The information in this fact sheet is not intended as medical or legal advice, or as a substitute for consultation with a physician or other licensed health care provider.  Patients with health care-related questions should call or see their physician or other health care provider promptly and should not disregard professional medical advice, or delay seeking it, because of information encountered here. The mention of any product, service, or treatment in this fact sheet should not be construed as an ASCO endorsement. ASCO is not responsible for any injury or damage to persons or property arising out of or related to any use of ASCOs patient education materials, or to any errors or omissions. To order more printed copies, please call [phone] or visit www.cancer. net/estore. TERMS TO KNOW     Benign: A growth that is not cancerous     Biopsy: Removal of a small tissue sample that is examined under a microscope to check for cancer cells     Chemotherapy: The use of drugs to destroy cancer cells     DCIS: Ductal carcinoma in situ; cancer that has not spread past the ducts and is not invasive     Lymph node: A tiny, bean-shaped organ that fights infection     Lumpectomy: The surgical removal of the tumor and an area of healthy tissue around the tumor     Malignant: A cancerous growth or mass     Mastectomy: Surgical removal of the entire breast     Metastasis: The spread of cancer to another part of the body, usually to another organ     Oncologist: A doctor who specializes in treating cancer     Radiation therapy: The use of high-energy x-rays to destroy cancer cells     Tumor: An abnormal growth of body tissue     [address] [facility], [address] [place] [address], [address] [place] [address]  Toll Free: [phone]  Phone: [phone] www. SkyPower. net  [email or web address]. org © 2017 American Society of Clinical Oncology. For permissions information, contact [email or web address].   [email or web address]       Plan:  - You do not have breast cancer, but the ADH does put you at a higher risk especially with your family history. - Continue to have mammograms with primary care. - any concerns in the future you can reach out to us.      Follow Up:  - As needed    Treatment Summary has been discussed and given to patient: n/a        -------------------------------------------------------------------------------------------------------------------  Please call our office at [phone] if you have any  of the following symptoms:   Fever of 100.5 or greater  Chills  Shortness of breath  Swelling or pain in one leg    After office hours an answering service is available and will contact a provider for emergencies or if you are experiencing any of the above symptoms. Patient has My Chart. My Chart log in information explained on the after visit summary printout at the [address] desk.     Tomás Motley RN

## 2023-01-30 ENCOUNTER — OFFICE VISIT (OUTPATIENT)
Dept: OBGYN CLINIC | Age: 43
End: 2023-01-30
Payer: COMMERCIAL

## 2023-01-30 VITALS
DIASTOLIC BLOOD PRESSURE: 80 MMHG | WEIGHT: 134.6 LBS | SYSTOLIC BLOOD PRESSURE: 121 MMHG | HEIGHT: 68 IN | BODY MASS INDEX: 20.4 KG/M2

## 2023-01-30 DIAGNOSIS — F41.1 GENERALIZED ANXIETY DISORDER: Primary | ICD-10-CM

## 2023-01-30 PROCEDURE — 99213 OFFICE O/P EST LOW 20 MIN: CPT | Performed by: STUDENT IN AN ORGANIZED HEALTH CARE EDUCATION/TRAINING PROGRAM

## 2023-01-30 RX ORDER — BUSPIRONE HYDROCHLORIDE 5 MG/1
5 TABLET ORAL 3 TIMES DAILY
COMMUNITY
End: 2023-01-30 | Stop reason: SDUPTHER

## 2023-01-30 RX ORDER — BUSPIRONE HYDROCHLORIDE 5 MG/1
5 TABLET ORAL NIGHTLY
Qty: 90 TABLET | Refills: 3 | Status: SHIPPED | OUTPATIENT
Start: 2023-01-30

## 2023-01-30 NOTE — PROGRESS NOTES
Marcos President (: 1980) is a 41 y.o. No obstetric history on file., New patient (prev seen at [facility]), here for evaluation of the following chief complaint(s):    Medication Refill     HPI:  Pt is here for medication refill/establish care at [facility]. Previously seen by  at [facility]. On Buspirone 5mg for anxiety, needs refill. Due for annual 3/2023 per pt. ASSESSMENT/PLAN:  1. Generalized anxiety disorder  Overview:  23: on buspar, stable will refill today. Orders:  -     busPIRone (BUSPAR) 5 MG tablet; Take 1 tablet by mouth nightly, Disp-90 tablet, R-3Normal     Return in about 2 months (around 3/30/2023) for annual.    SUBJECTIVE/OBJECTIVE:  /80   Ht 5' 7.5\" (1.715 m)   Wt 134 lb 9.6 oz (61.1 kg)   BMI 20.77 kg/m²      Past Medical History:   Diagnosis Date    Breast disorder     Adh-had lumpectomy    Ectopic pregnancy     Infertility, female -      Current Outpatient Medications   Medication Sig Dispense Refill    busPIRone (BUSPAR) 5 MG tablet Take 1 tablet by mouth nightly 90 tablet 3     No current facility-administered medications for this visit.      No Known Allergies  Social History     Socioeconomic History    Marital status:      Spouse name: Not on file    Number of children: Not on file    Years of education: Not on file    Highest education level: Not on file   Occupational History    Not on file   Tobacco Use    Smoking status: Never    Smokeless tobacco: Never   Vaping Use    Vaping Use: Never used   Substance and Sexual Activity    Alcohol use: Yes     Comment: occ    Drug use: Not Currently    Sexual activity: Yes     Partners: Male   Other Topics Concern    Not on file   Social History Narrative    Not on file     Social Determinants of Health     Financial Resource Strain: Not on file   Food Insecurity: Not on file   Transportation Needs: Not on file   Physical Activity: Not on file   Stress: Not on file   Social Connections: Not on file   Intimate Partner Violence: Not on file   Housing Stability: Not on file      Past Surgical History:   Procedure Laterality Date    BREAST BIOPSY Right 2022    Stereotactic Biopsy    BREAST BIOPSY Right 2022    RIGHT BREAST NEEDLE LOCALIZED BIOPSY performed by Declan Gandhi MD at [address] [facility] LUMPECTOMY Right 2022    RIGHT BREAST LUMPECTOMY/PARTIAL performed by Declan Gandhi MD at [address] [facility] LUMPECTOMY Right 2022    ADH    COLPOSCOPY      DILATION AND CURETTAGE  Ifeoma 10, 2022    SHAYLEE STEROTACTIC LOC BREAST BIOPSY RIGHT Right 2022    SHAYLEE STEROTACTIC LOC BREAST BIOPSY RIGHT 2022 [facility]    SINUS SURGERY Bilateral       OB History    Para Term  AB Living   1       1 0   SAB IAB Ectopic Molar Multiple Live Births       1            # Outcome Date GA Lbr Demarcus/2nd Weight Sex Delivery Anes PTL Lv   1 Ectopic 2021                  Review of Systems   All other systems reviewed and are negative. Physical Exam  Constitutional:       General: She is not in acute distress. Appearance: Normal appearance. She is not ill-appearing. HENT:      Head: Normocephalic and atraumatic. Nose: Nose normal.   Eyes:      Extraocular Movements: Extraocular movements intact. Conjunctiva/sclera: Conjunctivae normal.   Pulmonary:      Effort: Pulmonary effort is normal. No respiratory distress. Abdominal:      Palpations: Abdomen is soft. Musculoskeletal:         General: Normal range of motion. Cervical back: Normal range of motion. Neurological:      General: No focal deficit present. Mental Status: She is alert and oriented to person, place, and time. Skin:     General: Skin is warm and dry.    Psychiatric:         Mood and Affect: Mood normal.         Behavior: Behavior normal.          On this date 2023 I have spent 20 minutes reviewing previous notes, test results and face to face with the patient discussing the diagnosis and importance of compliance with the treatment plan as well as documenting on the day of the visit. An electronic signature was used to authenticate this note.     --Justine Mota MD

## 2023-03-27 SDOH — ECONOMIC STABILITY: INCOME INSECURITY: HOW HARD IS IT FOR YOU TO PAY FOR THE VERY BASICS LIKE FOOD, HOUSING, MEDICAL CARE, AND HEATING?: NOT VERY HARD

## 2023-03-27 SDOH — ECONOMIC STABILITY: TRANSPORTATION INSECURITY
IN THE PAST 12 MONTHS, HAS LACK OF TRANSPORTATION KEPT YOU FROM MEETINGS, WORK, OR FROM GETTING THINGS NEEDED FOR DAILY LIVING?: NO

## 2023-03-27 SDOH — ECONOMIC STABILITY: FOOD INSECURITY: WITHIN THE PAST 12 MONTHS, YOU WORRIED THAT YOUR FOOD WOULD RUN OUT BEFORE YOU GOT MONEY TO BUY MORE.: NEVER TRUE

## 2023-03-27 SDOH — ECONOMIC STABILITY: HOUSING INSECURITY
IN THE LAST 12 MONTHS, WAS THERE A TIME WHEN YOU DID NOT HAVE A STEADY PLACE TO SLEEP OR SLEPT IN A SHELTER (INCLUDING NOW)?: NO

## 2023-03-27 SDOH — ECONOMIC STABILITY: FOOD INSECURITY: WITHIN THE PAST 12 MONTHS, THE FOOD YOU BOUGHT JUST DIDN'T LAST AND YOU DIDN'T HAVE MONEY TO GET MORE.: NEVER TRUE

## 2023-03-30 ENCOUNTER — OFFICE VISIT (OUTPATIENT)
Dept: OBGYN CLINIC | Age: 43
End: 2023-03-30
Payer: COMMERCIAL

## 2023-03-30 VITALS
WEIGHT: 136.2 LBS | HEIGHT: 68 IN | DIASTOLIC BLOOD PRESSURE: 74 MMHG | BODY MASS INDEX: 20.64 KG/M2 | SYSTOLIC BLOOD PRESSURE: 118 MMHG

## 2023-03-30 DIAGNOSIS — Z11.51 SCREENING FOR HUMAN PAPILLOMAVIRUS (HPV): ICD-10-CM

## 2023-03-30 DIAGNOSIS — Z12.4 SCREENING FOR MALIGNANT NEOPLASM OF CERVIX: ICD-10-CM

## 2023-03-30 DIAGNOSIS — Z76.89 ENCOUNTER TO ESTABLISH CARE: ICD-10-CM

## 2023-03-30 DIAGNOSIS — N60.91 ATYPICAL DUCTAL HYPERPLASIA OF RIGHT BREAST: ICD-10-CM

## 2023-03-30 DIAGNOSIS — Z80.3 FAMILY HISTORY OF BREAST CANCER: ICD-10-CM

## 2023-03-30 DIAGNOSIS — Z01.419 WELL WOMAN EXAM: Primary | ICD-10-CM

## 2023-03-30 PROCEDURE — 99396 PREV VISIT EST AGE 40-64: CPT | Performed by: STUDENT IN AN ORGANIZED HEALTH CARE EDUCATION/TRAINING PROGRAM

## 2023-03-30 NOTE — PROGRESS NOTES
masses, no skin or nipple changes or axillary nodes    PELVIC EXAM: External genitalia is within normal limits, urethra, urethra meatus and bladder are midline well supported. Vagina is well rugated, Cervix comes into full view and is within normal limits. Uterus is retroverted, normal week size, no ovarian masses palpated    Assessment/Plan:     1. Well woman exam  Overview:  3/2022: NILM pap, HPV+ 16. Normal colposcopy  3/30/23: pap smear pending  Breast MRI ordered  High risk for breast cancer due to atypical ductal hyperplasia and FMHx of breast cancer- will place referral to Dr. Nafisa Keen  2. Atypical ductal hyperplasia of right breast  Overview:  3/30/23: s/p lumpectomy 5/2022. FMHx of breast cancer in mother. Pt declined tamoxifen due to trying to get pregnant. No longer attempting pregnancy. Will refer to Dr. Nafisa Keen. Will place order for breast MRI. Orders:  -     MRI BREAST BILATERAL W WO CONTRAST; Future  -     Toney Steele MD, General Surgery, [facility]  3. Family history of breast cancer  -     MRI BREAST BILATERAL W WO CONTRAST; Future  4. Encounter to establish care  -     [address] [place] [address]  5. Screening for human papillomavirus (HPV)  -     PAP IG, HPV Rfx HPV 16/18,45; Future  6. Screening for malignant neoplasm of cervix  -     PAP IG, HPV Rfx HPV 16/18,45;  Future     Ramandeep Barrera MD

## 2023-04-05 ENCOUNTER — TELEPHONE (OUTPATIENT)
Dept: OBGYN CLINIC | Age: 43
End: 2023-04-05

## 2023-04-05 NOTE — TELEPHONE ENCOUNTER
Received phone call from [facility] asking which doctor  preferred pt to see. Confirmed w/  that she wants pt to see Robby Schwab [facility] for referral coordinator notifying of which doctor.

## 2023-04-12 PROBLEM — R87.619 ABNORMAL PAP SMEAR OF CERVIX: Status: ACTIVE | Noted: 2023-04-12

## 2023-04-17 ENCOUNTER — OFFICE VISIT (OUTPATIENT)
Dept: SURGERY | Age: 43
End: 2023-04-17
Payer: COMMERCIAL

## 2023-04-17 VITALS
BODY MASS INDEX: 20.16 KG/M2 | WEIGHT: 133 LBS | HEART RATE: 82 BPM | DIASTOLIC BLOOD PRESSURE: 97 MMHG | HEIGHT: 68 IN | SYSTOLIC BLOOD PRESSURE: 145 MMHG

## 2023-04-17 DIAGNOSIS — N60.91 ATYPICAL DUCTAL HYPERPLASIA OF RIGHT BREAST: ICD-10-CM

## 2023-04-17 DIAGNOSIS — Z80.3 FAMILY HISTORY OF MALIGNANT NEOPLASM OF BREAST: Primary | ICD-10-CM

## 2023-04-17 PROCEDURE — 99214 OFFICE O/P EST MOD 30 MIN: CPT | Performed by: SURGERY

## 2023-04-17 ASSESSMENT — ENCOUNTER SYMPTOMS
ALLERGIC/IMMUNOLOGIC NEGATIVE: 1
RESPIRATORY NEGATIVE: 1
EYES NEGATIVE: 1
GASTROINTESTINAL NEGATIVE: 1

## 2023-04-17 NOTE — PROGRESS NOTES
4/17/2023    Ashley Higginbotham  MRN: [id number]      CHIEF COMPLAINT: Here for breast check, history of atypical ductal hyperplasia      PRIMARY CARE PHYSICIAN: Pcp No      HISTORY:  History of right breast atypical ductal hyperplasia found off of core biopsy. She underwent a surgical biopsy on 5/5/2022 with final pathology showing fibrocystic changes and benign breast tissue. She met with medical oncology and elected not to do endocrine risk reduction treatment at the time. She has family history of breast cancer in her mother. She has no current complaints, specifically no complaints of breast masses, nipple discharge, or breast pain. She is scheduled for bilateral breast MRI on 4/21/2023    REVIEW OF SYSTEMS:  Review of Systems   Constitutional: Negative. HENT: Negative. Eyes: Negative. Respiratory: Negative. Cardiovascular: Negative. Gastrointestinal: Negative. Endocrine: Negative. Genitourinary: Negative. Musculoskeletal: Negative. Skin: Negative. Allergic/Immunologic: Negative. Neurological: Negative. Hematological: Negative. Psychiatric/Behavioral: Negative. Past Medical History:   Diagnosis Date    Breast disorder 4/22    Adh-had lumpectomy    Ectopic pregnancy 2/21    Infertility, female 2/21-5/22       Current Outpatient Medications   Medication Sig Dispense Refill    busPIRone (BUSPAR) 5 MG tablet Take 1 tablet by mouth nightly 90 tablet 3     No current facility-administered medications for this visit.        Family History   Problem Relation Age of Onset    Leukemia Father     Breast Cancer Mother         in remission and cancer free for 5 years       Social History     Socioeconomic History    Marital status:      Spouse name: None    Number of children: None    Years of education: None    Highest education level: None   Tobacco Use    Smoking status: Never    Smokeless tobacco: Never   Vaping Use    Vaping Use: Never used

## 2023-04-21 ENCOUNTER — HOSPITAL ENCOUNTER (OUTPATIENT)
Dept: MRI IMAGING | Age: 43
Discharge: HOME | End: 2023-04-24

## 2023-04-21 DIAGNOSIS — N60.91 ATYPICAL DUCTAL HYPERPLASIA OF RIGHT BREAST: ICD-10-CM

## 2023-04-21 DIAGNOSIS — Z80.3 FAMILY HISTORY OF BREAST CANCER: ICD-10-CM

## 2023-04-28 ENCOUNTER — HOSPITAL ENCOUNTER (OUTPATIENT)
Dept: MRI IMAGING | Age: 43
Discharge: HOME | End: 2023-04-28
Payer: COMMERCIAL

## 2023-04-28 PROCEDURE — C8908 MRI W/O FOL W/CONT, BREAST,: HCPCS

## 2023-04-28 PROCEDURE — 6360000004 HC RX CONTRAST MEDICATION: Performed by: STUDENT IN AN ORGANIZED HEALTH CARE EDUCATION/TRAINING PROGRAM

## 2023-04-28 PROCEDURE — A9579 GAD-BASE MR CONTRAST NOS,1ML: HCPCS | Performed by: STUDENT IN AN ORGANIZED HEALTH CARE EDUCATION/TRAINING PROGRAM

## 2023-04-28 PROCEDURE — 2580000003 HC RX 258: Performed by: STUDENT IN AN ORGANIZED HEALTH CARE EDUCATION/TRAINING PROGRAM

## 2023-04-28 RX ORDER — SODIUM CHLORIDE 0.9 % (FLUSH) 0.9 %
20 SYRINGE (ML) INJECTION AS NEEDED
Status: DISCONTINUED | OUTPATIENT
Start: 2023-04-28 | End: 2023-05-02 | Stop reason: HOSPADM

## 2023-04-28 RX ADMIN — GADOTERIDOL 16 ML: 279.3 INJECTION, SOLUTION INTRAVENOUS at 18:01

## 2023-04-28 RX ADMIN — SODIUM CHLORIDE, PRESERVATIVE FREE 20 ML: 5 INJECTION INTRAVENOUS at 18:01

## 2023-05-04 ENCOUNTER — HOSPITAL ENCOUNTER (OUTPATIENT)
Dept: MAMMOGRAPHY | Age: 43
Discharge: HOME | End: 2023-05-04
Payer: COMMERCIAL

## 2023-05-04 ENCOUNTER — HOSPITAL ENCOUNTER (OUTPATIENT)
Dept: MAMMOGRAPHY | Age: 43
End: 2023-05-04
Payer: COMMERCIAL

## 2023-05-04 DIAGNOSIS — R92.8 ABNORMAL MRI, BREAST: ICD-10-CM

## 2023-05-04 PROCEDURE — 76642 ULTRASOUND BREAST LIMITED: CPT

## 2023-05-04 PROCEDURE — G0279 TOMOSYNTHESIS, MAMMO: HCPCS

## 2023-05-12 PROBLEM — Z01.419 WELL WOMAN EXAM: Status: RESOLVED | Noted: 2023-03-30 | Resolved: 2023-05-12

## 2023-06-12 PROBLEM — Z76.89 ENCOUNTER TO ESTABLISH CARE: Status: ACTIVE | Noted: 2023-06-12

## 2023-08-01 ENCOUNTER — TELEPHONE (OUTPATIENT)
Dept: INTERNAL MEDICINE CLINIC | Facility: CLINIC | Age: 43
End: 2023-08-01

## 2023-08-01 DIAGNOSIS — F41.1 GENERALIZED ANXIETY DISORDER: Primary | ICD-10-CM

## 2023-08-01 NOTE — TELEPHONE ENCOUNTER
Spoke with patient. She would like to try one of the SSRI medications. Feels she would like something to take daily. She did go ahead and schedule a 4 week follow up for 08/29/23.

## 2023-08-01 NOTE — TELEPHONE ENCOUNTER
BuSpar was started by GYN, during our last visit she mentioned that she was feeling okay with the medication and that is why we did not make any changes. If she feels her anxiety is getting worse, we may try medications like SSRIs, including Lexapro, Zoloft, fluoxetine, and other medications like Effexor, but these medications are to be taken every day    If she wants to try something as needed, and her anxiety is not every day, we may try medications like hydroxyzine.     Please let me know if she is interested in any particular options, and schedule a follow-up 3 to 4 weeks after initiation of the therapy

## 2023-08-01 NOTE — TELEPHONE ENCOUNTER
Patient called stating that she has been taking her Buspar but has been having more panic attack feelings lately. Medication does not seem to be helping. States she would like to have this under control before school starts. Asking if there is something different she can try. Please Advise.

## 2023-08-02 RX ORDER — ESCITALOPRAM OXALATE 5 MG/1
5 TABLET ORAL DAILY
Qty: 30 TABLET | Refills: 1 | Status: SHIPPED | OUTPATIENT
Start: 2023-08-02

## 2023-08-02 NOTE — TELEPHONE ENCOUNTER
Will try lexapro, it may take few weeks to see a benefit ,but will revaluate as scheduled    Future Appointments   Date Time Provider [address]   8/29/2023  2:00 PM MD DEVIN Brush [facility]

## 2023-08-29 ENCOUNTER — OFFICE VISIT (OUTPATIENT)
Dept: INTERNAL MEDICINE CLINIC | Facility: CLINIC | Age: 43
End: 2023-08-29
Payer: COMMERCIAL

## 2023-08-29 VITALS
OXYGEN SATURATION: 98 % | HEIGHT: 67 IN | SYSTOLIC BLOOD PRESSURE: 112 MMHG | BODY MASS INDEX: 20.84 KG/M2 | DIASTOLIC BLOOD PRESSURE: 76 MMHG | HEART RATE: 47 BPM | WEIGHT: 132.8 LBS

## 2023-08-29 DIAGNOSIS — F41.1 GENERALIZED ANXIETY DISORDER: Primary | ICD-10-CM

## 2023-08-29 PROCEDURE — 99213 OFFICE O/P EST LOW 20 MIN: CPT | Performed by: INTERNAL MEDICINE

## 2023-08-29 RX ORDER — ESCITALOPRAM OXALATE 5 MG/1
5 TABLET ORAL DAILY
Qty: 90 TABLET | Refills: 0 | Status: SHIPPED | OUTPATIENT
Start: 2023-08-29

## 2023-08-29 ASSESSMENT — ENCOUNTER SYMPTOMS
COUGH: 0
SHORTNESS OF BREATH: 0
ABDOMINAL PAIN: 0
ABDOMINAL DISTENTION: 0
CHEST TIGHTNESS: 0
CONSTIPATION: 0

## 2023-08-29 NOTE — ASSESSMENT & PLAN NOTE
At goal, continue current medications, medication adherence emphasized, lifestyle modifications recommended and 3 months follow up

## 2023-08-29 NOTE — PROGRESS NOTES
Chief Complaint   Patient presents with    Follow-up     Anxiety - wants to discuss Lexapro dose. Abdoulaye Desir is a 41 y.o. female who presents today for Follow-up (Anxiety - wants to discuss Lexapro dose. )     She is here to follow-up in anxiety, earlier this month she contacted me with concerns about increasing anxiety, panic episodes, so we decided to start low-dose of Lexapro 5 mg, she has been tolerating the medication well, she also reports feeling with good energy, sleeping well, denies any significant side effects other than sweaty feet and hands, bowels have been regular, denies any increasing depression or manic behaviors  She also believes that the fact that her divorce is going to be finalized in November has been keeping her a sense of peace. She has been exercising, running, trying meditation    Wt Readings from Last 3 Encounters:   08/29/23 132 lb 12.8 oz (60.2 kg)   06/12/23 131 lb 12.8 oz (59.8 kg)   04/17/23 133 lb (60.3 kg)     Vitals:    08/29/23 1357   BP: 112/76   Site: Left Upper Arm   Position: Sitting   Pulse: (!) 47   SpO2: 98%   Weight: 132 lb 12.8 oz (60.2 kg)   Height: 5' 7\" (1.702 m)        Assessment and plan:  1. Generalized anxiety disorder  Assessment & Plan:   At goal, continue current medications, medication adherence emphasized, lifestyle modifications recommended and 3 months follow up   Tolerating lexapro  Orders:  -     escitalopram (LEXAPRO) 5 MG tablet; Take 1 tablet by mouth daily, Disp-90 tablet, R-0Normal      Return in about 3 months (around 11/29/2023). Review of system:    Review of Systems   Constitutional:  Negative for activity change, chills, fatigue and fever. Respiratory:  Negative for cough, chest tightness and shortness of breath. Cardiovascular:  Negative for chest pain. Gastrointestinal:  Negative for abdominal distention, abdominal pain and constipation. Neurological:  Negative for dizziness and headaches.

## 2023-11-29 ENCOUNTER — OFFICE VISIT (OUTPATIENT)
Dept: INTERNAL MEDICINE CLINIC | Facility: CLINIC | Age: 43
End: 2023-11-29
Payer: COMMERCIAL

## 2023-11-29 VITALS
BODY MASS INDEX: 21.12 KG/M2 | SYSTOLIC BLOOD PRESSURE: 120 MMHG | HEIGHT: 67 IN | DIASTOLIC BLOOD PRESSURE: 64 MMHG | OXYGEN SATURATION: 100 % | HEART RATE: 58 BPM | WEIGHT: 134.6 LBS

## 2023-11-29 DIAGNOSIS — F41.1 GENERALIZED ANXIETY DISORDER: ICD-10-CM

## 2023-11-29 DIAGNOSIS — N60.91 ATYPICAL DUCTAL HYPERPLASIA OF RIGHT BREAST: Primary | ICD-10-CM

## 2023-11-29 PROCEDURE — 99213 OFFICE O/P EST LOW 20 MIN: CPT | Performed by: INTERNAL MEDICINE

## 2023-11-29 RX ORDER — ESCITALOPRAM OXALATE 5 MG/1
5 TABLET ORAL DAILY
Qty: 90 TABLET | Refills: 1 | Status: SHIPPED | OUTPATIENT
Start: 2023-11-29

## 2023-11-29 ASSESSMENT — ENCOUNTER SYMPTOMS
CHEST TIGHTNESS: 0
CONSTIPATION: 0
ABDOMINAL PAIN: 0
COUGH: 0
ABDOMINAL DISTENTION: 0
SHORTNESS OF BREATH: 0

## 2023-11-29 NOTE — PROGRESS NOTES
Chief Complaint   Patient presents with    Follow-up     3 month f/u. Sierra Berry is a 41 y.o. female who presents today for Follow-up (3 month f/u. )     Here for follow-up in anxiety, she has been taking Lexapro 5 mg for the last 3 to 4 months, reports her anxiety has been much better controlled, she reports more energy, and is anxious thoughts, she also has finalized her divorce, continue to follow-up with counselor, she feels in good spirits, and would like to continue current doses, she reports no side effects, occasional constipation  She was reminded to complete her mammogram to in September,    [place] Readings from Last 3 Encounters:   11/29/23 61.1 kg (134 lb 9.6 oz)   08/29/23 60.2 kg (132 lb 12.8 oz)   06/12/23 59.8 kg (131 lb 12.8 oz)     Vitals:    11/29/23 1359   BP: 120/64   Site: Left Upper Arm   Position: Sitting   Pulse: 58   SpO2: 100%   Weight: 61.1 kg (134 lb 9.6 oz)   Height: 1.702 m (5' 7\")        Assessment and plan:  1. Atypical ductal hyperplasia of right breast  2. Generalized anxiety disorder  -     escitalopram (LEXAPRO) 5 MG tablet; Take 1 tablet by mouth daily, Disp-90 tablet, R-1Normal  Her anxiety is well controlled, will continue to follow-up every 6 months, we may consider stopping the medication later on, if her situation continues to be stable, she will continue with her daily routine, counseling, and medication  Reviewed her breast surgery note, per his note, he was recommended mammogram every 6 months, currently due for mammogram    Return in about 6 months (around 5/29/2024) for anxiety/depression. Review of system:    Review of Systems   Constitutional:  Negative for activity change, chills, fatigue and fever. Respiratory:  Negative for cough, chest tightness and shortness of breath. Cardiovascular:  Negative for chest pain. Gastrointestinal:  Negative for abdominal distention, abdominal pain and constipation.    Neurological:  Negative for

## 2024-04-12 SDOH — ECONOMIC STABILITY: FOOD INSECURITY: WITHIN THE PAST 12 MONTHS, THE FOOD YOU BOUGHT JUST DIDN'T LAST AND YOU DIDN'T HAVE MONEY TO GET MORE.: NEVER TRUE

## 2024-04-12 SDOH — ECONOMIC STABILITY: FOOD INSECURITY: WITHIN THE PAST 12 MONTHS, YOU WORRIED THAT YOUR FOOD WOULD RUN OUT BEFORE YOU GOT MONEY TO BUY MORE.: NEVER TRUE

## 2024-04-15 ENCOUNTER — OFFICE VISIT (OUTPATIENT)
Dept: OBGYN CLINIC | Age: 44
End: 2024-04-15
Payer: COMMERCIAL

## 2024-04-15 VITALS
DIASTOLIC BLOOD PRESSURE: 78 MMHG | WEIGHT: 138.7 LBS | BODY MASS INDEX: 21.77 KG/M2 | SYSTOLIC BLOOD PRESSURE: 110 MMHG | HEIGHT: 67 IN

## 2024-04-15 DIAGNOSIS — N60.91 ATYPICAL DUCTAL HYPERPLASIA OF RIGHT BREAST: ICD-10-CM

## 2024-04-15 DIAGNOSIS — Z11.51 SCREENING FOR HPV (HUMAN PAPILLOMAVIRUS): ICD-10-CM

## 2024-04-15 DIAGNOSIS — F41.1 GENERALIZED ANXIETY DISORDER: ICD-10-CM

## 2024-04-15 DIAGNOSIS — Z11.51 SCREENING FOR HUMAN PAPILLOMAVIRUS (HPV): ICD-10-CM

## 2024-04-15 DIAGNOSIS — R87.612 LOW GRADE SQUAMOUS INTRAEPITHELIAL LESION ON CYTOLOGIC SMEAR OF CERVIX (LGSIL): ICD-10-CM

## 2024-04-15 DIAGNOSIS — Z80.3 FAMILY HISTORY OF BREAST CANCER: Primary | ICD-10-CM

## 2024-04-15 DIAGNOSIS — Z01.419 WELL WOMAN EXAM: ICD-10-CM

## 2024-04-15 DIAGNOSIS — Z12.31 SCREENING MAMMOGRAM FOR BREAST CANCER: ICD-10-CM

## 2024-04-15 DIAGNOSIS — Z13.89 SCREENING FOR GENITOURINARY CONDITION: ICD-10-CM

## 2024-04-15 PROCEDURE — 99396 PREV VISIT EST AGE 40-64: CPT | Performed by: STUDENT IN AN ORGANIZED HEALTH CARE EDUCATION/TRAINING PROGRAM

## 2024-04-15 NOTE — PROGRESS NOTES
HPI:  Ms. West is a 44 y.o.  who is here today for a well woman exam. She complains of nothing.    Reports having nausea before her periods, which are monthly.     Date Performed Result   PAP 23 LSIL. HPV Negative.   Mammogram 23 Heterogeneously dense    Colonoscopy NA NA   Dexa NA NA         GYN History     LMP: 3/26/24  Cycle Length 28   Lasting 3  negative dysmenorrhea  positive postcoital bleeding    Past Medical History:  Past Medical History:   Diagnosis Date    Anxiety     Breast disorder     Adh-had lumpectomy    Ectopic pregnancy     Infertility, female -       Past Surgical History:  Past Surgical History:   Procedure Laterality Date    BREAST BIOPSY Right 2022    Stereotactic Biopsy    BREAST BIOPSY Right 2022    RIGHT BREAST NEEDLE LOCALIZED BIOPSY performed by Matthew Velez MD at [facility] [facility] OR    BREAST LUMPECTOMY Right 2022    RIGHT BREAST LUMPECTOMY/PARTIAL performed by Matthew Velez MD at [facility] MAIN OR    BREAST LUMPECTOMY Right 2022    ADH    COLPOSCOPY      DILATION AND CURETTAGE  Ifeoma 10, 2022    SHAYLEE STEROTACTIC LOC BREAST BIOPSY RIGHT Right 2022    SHAYLEE STEROTACTIC LOC BREAST BIOPSY RIGHT 2022 [facility] RADIOLOGY MAMMO    SINUS SURGERY Bilateral        Allergies:   No Known Allergies    Medication History:  Current Outpatient Medications   Medication Sig Dispense Refill    escitalopram (LEXAPRO) 5 MG tablet Take 1 tablet by mouth daily 90 tablet 1    Multiple Vitamin (MULTIVITAMIN ADULT PO) Take by mouth      Emollient (COLLAGEN EX) Apply topically       No current facility-administered medications for this visit.       Social History:  Social History     Socioeconomic History    Marital status:      Spouse name: Not on file    Number of children: Not on file    Years of education: Not on file    Highest education level: Not on file   Occupational History    Not on file   Tobacco Use    Smoking status: Never

## 2024-04-22 LAB
COLLECTION METHOD: NORMAL
CYTOLOGIST CVX/VAG CYTO: NORMAL
CYTOLOGY CVX/VAG DOC THIN PREP: NORMAL
DATE OF LMP: NORMAL
HPV APTIMA: NEGATIVE
Lab: NORMAL
PAP SOURCE: NORMAL
PATH REPORT.FINAL DX SPEC: NORMAL
STAT OF ADQ CVX/VAG CYTO-IMP: NORMAL

## 2024-05-28 ASSESSMENT — PATIENT HEALTH QUESTIONNAIRE - PHQ9
2. FEELING DOWN, DEPRESSED OR HOPELESS: NOT AT ALL
1. LITTLE INTEREST OR PLEASURE IN DOING THINGS: NOT AT ALL
SUM OF ALL RESPONSES TO PHQ9 QUESTIONS 1 & 2: 0
SUM OF ALL RESPONSES TO PHQ QUESTIONS 1-9: 0
SUM OF ALL RESPONSES TO PHQ QUESTIONS 1-9: 0
SUM OF ALL RESPONSES TO PHQ9 QUESTIONS 1 & 2: 0
2. FEELING DOWN, DEPRESSED OR HOPELESS: NOT AT ALL
1. LITTLE INTEREST OR PLEASURE IN DOING THINGS: NOT AT ALL
SUM OF ALL RESPONSES TO PHQ QUESTIONS 1-9: 0
SUM OF ALL RESPONSES TO PHQ QUESTIONS 1-9: 0

## 2024-05-29 ENCOUNTER — OFFICE VISIT (OUTPATIENT)
Dept: INTERNAL MEDICINE CLINIC | Facility: CLINIC | Age: 44
End: 2024-05-29
Payer: COMMERCIAL

## 2024-05-29 ENCOUNTER — HOSPITAL ENCOUNTER (OUTPATIENT)
Dept: MAMMOGRAPHY | Age: 44
Discharge: HOME | End: 2024-06-01
Attending: STUDENT IN AN ORGANIZED HEALTH CARE EDUCATION/TRAINING PROGRAM
Payer: COMMERCIAL

## 2024-05-29 VITALS
HEIGHT: 67 IN | SYSTOLIC BLOOD PRESSURE: 120 MMHG | DIASTOLIC BLOOD PRESSURE: 78 MMHG | OXYGEN SATURATION: 97 % | HEART RATE: 68 BPM | WEIGHT: 134.5 LBS | BODY MASS INDEX: 21.11 KG/M2

## 2024-05-29 DIAGNOSIS — F41.1 GENERALIZED ANXIETY DISORDER: ICD-10-CM

## 2024-05-29 DIAGNOSIS — Z12.31 SCREENING MAMMOGRAM FOR BREAST CANCER: ICD-10-CM

## 2024-05-29 DIAGNOSIS — Z01.419 WELL WOMAN EXAM: ICD-10-CM

## 2024-05-29 PROCEDURE — 77063 BREAST TOMOSYNTHESIS BI: CPT

## 2024-05-29 PROCEDURE — 99213 OFFICE O/P EST LOW 20 MIN: CPT | Performed by: INTERNAL MEDICINE

## 2024-05-29 RX ORDER — ESCITALOPRAM OXALATE 5 MG/1
5 TABLET ORAL DAILY
Qty: 90 TABLET | Refills: 1 | Status: SHIPPED | OUTPATIENT
Start: 2024-05-29

## 2024-05-29 ASSESSMENT — ENCOUNTER SYMPTOMS
CHEST TIGHTNESS: 0
COUGH: 0
SHORTNESS OF BREATH: 0
ABDOMINAL PAIN: 0
ABDOMINAL DISTENTION: 0
CONSTIPATION: 0

## 2024-05-29 NOTE — PROGRESS NOTES
Chief Complaint   Patient presents with    Follow-up     anxiety/depression f/u        Stephanie West is a 44 y.o. female who presents today for Follow-up (anxiety/depression f/u)     Follow-up in anxiety depression, divorce is finalized, she feels very well with Lexapro 5 mg, she has been taking these since last year, she denies any side effects, she is currently feeling in good spirits, sleeping well, denies any extreme depression or manic behavior.  She would like to continue the medication    Wt Readings from Last 3 Encounters:   05/29/24 61 kg (134 lb 8 oz)   04/15/24 62.9 kg (138 lb 11.2 oz)   11/29/23 61.1 kg (134 lb 9.6 oz)     Vitals:    05/29/24 1207   BP: 120/78   Site: Left Upper Arm   Position: Sitting   Pulse: 68   SpO2: 97%   Weight: 61 kg (134 lb 8 oz)   Height: 1.702 m (5' 7\")        Assessment and plan:  1. Generalized anxiety disorder  -     escitalopram (LEXAPRO) 5 MG tablet; Take 1 tablet by mouth daily, Disp-90 tablet, R-1Normal  She is currently doing well with Lexapro, will continue with Lexapro for another 6 months, and reevaluate, no side effects, no red flags,    Return in about 6 months (around 11/29/2024).     Review of system:    Review of Systems   Constitutional:  Negative for activity change, chills, fatigue and fever.   Respiratory:  Negative for cough, chest tightness and shortness of breath.    Cardiovascular:  Negative for chest pain.   Gastrointestinal:  Negative for abdominal distention, abdominal pain and constipation.   Neurological:  Negative for dizziness and headaches.   Psychiatric/Behavioral:  Negative for behavioral problems, decreased concentration, dysphoric mood and sleep disturbance. The patient is not nervous/anxious.          Immunization history:      There is no immunization history on file for this patient.    Current medications:      Current Outpatient Medications:     escitalopram (LEXAPRO) 5 MG tablet, Take 1 tablet by mouth daily, Disp: 90

## 2024-06-03 ENCOUNTER — PATIENT MESSAGE (OUTPATIENT)
Dept: INTERNAL MEDICINE CLINIC | Facility: CLINIC | Age: 44
End: 2024-06-03

## 2024-06-03 NOTE — TELEPHONE ENCOUNTER
Pt is currently experiencing rash outbreak on her face, is requesting antibiotic. Pt informed needs to be seen in office to prescribe any antibiotic. Sent msg through my chart as well as self-scheduling.

## 2024-06-03 NOTE — TELEPHONE ENCOUNTER
From: Stephanie West  To: Dr. Jaimie Haddad  Sent: 6/3/2024 8:43 AM EDT  Subject: Rash on face    Good morning! On Thursday, I began noticing a rash on the bridge of my nose. It has quickly spread and has small red bumps on my forehead and the sides of my face. I’m wondering if wearing my Sunhat and sweating started it.     I did a teledoc visit on Saturday and was prescribed 20 mg of prednisone. Today is my last dose of prednisone. The tenderness of my face has gone away, but I still have the red bumps on my forehead and sides of my face.     I have a dermatologist appointment scheduled for Tuesday, June 11. In the meantime, could you prescribe me amoxicillin or anything you feel fit to fight this irritation?     Thank you so much!!     Stephanie

## 2024-06-04 ENCOUNTER — OFFICE VISIT (OUTPATIENT)
Dept: INTERNAL MEDICINE CLINIC | Facility: CLINIC | Age: 44
End: 2024-06-04
Payer: COMMERCIAL

## 2024-06-04 VITALS
HEART RATE: 61 BPM | HEIGHT: 67 IN | BODY MASS INDEX: 21.56 KG/M2 | WEIGHT: 137.4 LBS | SYSTOLIC BLOOD PRESSURE: 118 MMHG | DIASTOLIC BLOOD PRESSURE: 86 MMHG | OXYGEN SATURATION: 99 %

## 2024-06-04 DIAGNOSIS — R21 PAPULAR RASH: Primary | ICD-10-CM

## 2024-06-04 PROBLEM — R87.810 HIGH-RISK HUMAN PAPILLOMAVIRUS (HPV) DNA DETECTED IN CERVICAL SPECIMEN: Status: ACTIVE | Noted: 2022-03-21

## 2024-06-04 PROCEDURE — 99214 OFFICE O/P EST MOD 30 MIN: CPT | Performed by: INTERNAL MEDICINE

## 2024-06-04 RX ORDER — DOXYCYCLINE HYCLATE 100 MG
100 TABLET ORAL 2 TIMES DAILY
Qty: 14 TABLET | Refills: 0 | Status: SHIPPED | OUTPATIENT
Start: 2024-06-04 | End: 2024-06-11

## 2024-06-04 ASSESSMENT — ENCOUNTER SYMPTOMS
SHORTNESS OF BREATH: 0
CHEST TIGHTNESS: 0
COUGH: 0

## 2024-06-04 NOTE — PROGRESS NOTES
tablet by mouth 2 times daily for 7 days, Disp: 14 tablet, Rfl: 0    escitalopram (LEXAPRO) 5 MG tablet, Take 1 tablet by mouth daily, Disp: 90 tablet, Rfl: 1    Multiple Vitamin (MULTIVITAMIN ADULT PO), Take by mouth, Disp: , Rfl:     Emollient (COLLAGEN EX), Apply topically, Disp: , Rfl:       Family history:    Family History   Problem Relation Age of Onset    Breast Cancer Mother         in remission and cancer free for 5 years    Leukemia Father         10/2022        Past medical history:    Past Medical History:   Diagnosis Date    Anxiety     Breast disorder 4/22    Adh-had lumpectomy    Ectopic pregnancy 2/21    Infertility, female 2/21-5/22        Past Surgical History:   Procedure Laterality Date    BREAST BIOPSY Right 04/06/2022    Stereotactic Biopsy    BREAST BIOPSY Right 5/5/2022    RIGHT BREAST NEEDLE LOCALIZED BIOPSY performed by Matthew Velez MD at [facility] [place]    BREAST LUMPECTOMY Right 5/5/2022    RIGHT BREAST LUMPECTOMY/PARTIAL performed by Matthew Velez MD at [facility]    BREAST LUMPECTOMY Right 05/05/2022    ADH    COLPOSCOPY  April, 2022    DILATION AND CURETTAGE  Ifeoma 10, 2022    SHAYLEE STEROTACTIC LOC BREAST BIOPSY RIGHT Right 04/06/2022    SHAYLEE STEROTACTIC LOC BREAST BIOPSY RIGHT 4/6/2022 [facility]    SINUS SURGERY Bilateral         Physical exam:    /86 (Site: Left Upper Arm, Position: Sitting)   Pulse 61   Ht 1.702 m (5' 7\")   Wt 62.3 kg (137 lb 6.4 oz)   SpO2 99%   BMI 21.52 kg/m²     Physical Exam  Vitals reviewed.   Constitutional:       Appearance: Normal appearance.   HENT:      Head: Normocephalic and atraumatic.   Pulmonary:      Effort: Pulmonary effort is normal.   Skin:     Findings: Rash present. Rash is papular.      Comments: Micropapular rash in face affecting malar area , forehead, shin   Neurological:      Mental Status: She is alert.          Recent labs:              This document was generated with the aid of voice recognition software..

## 2024-11-18 DIAGNOSIS — F41.1 GENERALIZED ANXIETY DISORDER: ICD-10-CM

## 2024-11-18 RX ORDER — ESCITALOPRAM OXALATE 5 MG/1
5 TABLET ORAL DAILY
Qty: 90 TABLET | Refills: 1 | Status: SHIPPED | OUTPATIENT
Start: 2024-11-18

## 2024-11-18 NOTE — TELEPHONE ENCOUNTER
Requested Prescriptions     Pending Prescriptions Disp Refills    escitalopram (LEXAPRO) 5 MG tablet 90 tablet 1     Sig: Take 1 tablet by mouth daily    No ov on file iFLYER message sent to patient.

## 2024-12-10 ENCOUNTER — OFFICE VISIT (OUTPATIENT)
Dept: INTERNAL MEDICINE CLINIC | Facility: CLINIC | Age: 44
End: 2024-12-10
Payer: COMMERCIAL

## 2024-12-10 VITALS
OXYGEN SATURATION: 98 % | BODY MASS INDEX: 21.47 KG/M2 | SYSTOLIC BLOOD PRESSURE: 114 MMHG | DIASTOLIC BLOOD PRESSURE: 78 MMHG | HEART RATE: 59 BPM | WEIGHT: 136.8 LBS | HEIGHT: 67 IN

## 2024-12-10 DIAGNOSIS — F41.1 GENERALIZED ANXIETY DISORDER: Primary | ICD-10-CM

## 2024-12-10 PROCEDURE — 99213 OFFICE O/P EST LOW 20 MIN: CPT | Performed by: INTERNAL MEDICINE

## 2024-12-10 RX ORDER — ESCITALOPRAM OXALATE 5 MG/1
5 TABLET ORAL DAILY
Qty: 90 TABLET | Refills: 1 | Status: SHIPPED | OUTPATIENT
Start: 2024-12-10

## 2024-12-10 ASSESSMENT — ENCOUNTER SYMPTOMS
CONSTIPATION: 0
CHEST TIGHTNESS: 0
COUGH: 0
SHORTNESS OF BREATH: 0
ABDOMINAL PAIN: 0
ABDOMINAL DISTENTION: 0

## 2024-12-10 NOTE — PROGRESS NOTES
history:      There is no immunization history on file for this patient.    Current medications:      Current Outpatient Medications:     escitalopram (LEXAPRO) 5 MG tablet, Take 1 tablet by mouth daily, Disp: 90 tablet, Rfl: 1    Multiple Vitamin (MULTIVITAMIN ADULT PO), Take by mouth, Disp: , Rfl:     Emollient (COLLAGEN EX), Apply topically, Disp: , Rfl:       Family history:    Family History   Problem Relation Age of Onset    Breast Cancer Mother         in remission and cancer free for 5 years    Leukemia Father         10/2022        Past medical history:    Past Medical History:   Diagnosis Date    Anxiety     Breast disorder 4/22    Adh-had lumpectomy    Ectopic pregnancy 2/21    Infertility, female 2/21-5/22        Past Surgical History:   Procedure Laterality Date    BREAST BIOPSY Right 04/06/2022    Stereotactic Biopsy    BREAST BIOPSY Right 5/5/2022    RIGHT BREAST NEEDLE LOCALIZED BIOPSY performed by Matthew Velez MD at [facility] [place]    BREAST LUMPECTOMY Right 5/5/2022    RIGHT BREAST LUMPECTOMY/PARTIAL performed by Matthew Velez MD at [facility] [place] [facility] [place]    BREAST LUMPECTOMY Right 05/05/2022    ADH    COLPOSCOPY  April, 2022    DILATION AND CURETTAGE  Ifeoma 10, 2022    SHAYLEE STEROTACTIC LOC BREAST BIOPSY RIGHT Right 04/06/2022    SHAYLEE STEROTACTIC LOC BREAST BIOPSY RIGHT 4/6/2022 [facility] [place] [facility]    SINUS SURGERY Bilateral         Physical exam:    /78 (Site: Left Upper Arm, Position: Sitting)   Pulse 59   Ht 1.702 m (5' 7\")   Wt 62.1 kg (136 lb 12.8 oz)   SpO2 98%   BMI 21.43 kg/m²     Physical Exam  Vitals reviewed.   Constitutional:       Appearance: Normal appearance.   HENT:      Head: Normocephalic and atraumatic.   Cardiovascular:      Rate and Rhythm: Normal rate and regular rhythm.   Pulmonary:      Effort: Pulmonary effort is normal.      Breath sounds: Normal breath sounds.   Neurological:      General: No focal deficit present.      Mental Status: She is alert and oriented to

## 2024-12-10 NOTE — ASSESSMENT & PLAN NOTE
Chronic, at goal (stable), continue current treatment plan, medication adherence emphasized, and lifestyle modifications recommended  Continue with current medications, we discussed about the options of starting titrating down to every other day, or half tablet daily, patient will continue with counseling services, and exercise routine, will follow-up again in 6 months

## 2025-05-12 SDOH — ECONOMIC STABILITY: FOOD INSECURITY: WITHIN THE PAST 12 MONTHS, YOU WORRIED THAT YOUR FOOD WOULD RUN OUT BEFORE YOU GOT MONEY TO BUY MORE.: NEVER TRUE

## 2025-05-12 SDOH — ECONOMIC STABILITY: FOOD INSECURITY: WITHIN THE PAST 12 MONTHS, THE FOOD YOU BOUGHT JUST DIDN'T LAST AND YOU DIDN'T HAVE MONEY TO GET MORE.: NEVER TRUE

## 2025-05-12 SDOH — ECONOMIC STABILITY: INCOME INSECURITY: IN THE LAST 12 MONTHS, WAS THERE A TIME WHEN YOU WERE NOT ABLE TO PAY THE MORTGAGE OR RENT ON TIME?: NO

## 2025-05-12 SDOH — ECONOMIC STABILITY: TRANSPORTATION INSECURITY
IN THE PAST 12 MONTHS, HAS THE LACK OF TRANSPORTATION KEPT YOU FROM MEDICAL APPOINTMENTS OR FROM GETTING MEDICATIONS?: NO

## 2025-05-15 ENCOUNTER — OFFICE VISIT (OUTPATIENT)
Dept: OBGYN CLINIC | Age: 45
End: 2025-05-15
Payer: COMMERCIAL

## 2025-05-15 VITALS
WEIGHT: 141.9 LBS | DIASTOLIC BLOOD PRESSURE: 76 MMHG | BODY MASS INDEX: 22.27 KG/M2 | HEIGHT: 67 IN | SYSTOLIC BLOOD PRESSURE: 134 MMHG

## 2025-05-15 DIAGNOSIS — Z80.3 FAMILY HISTORY OF BREAST CANCER: ICD-10-CM

## 2025-05-15 DIAGNOSIS — N60.91 ATYPICAL DUCTAL HYPERPLASIA OF RIGHT BREAST: ICD-10-CM

## 2025-05-15 DIAGNOSIS — Z12.31 SCREENING MAMMOGRAM FOR BREAST CANCER: ICD-10-CM

## 2025-05-15 DIAGNOSIS — Z11.51 SCREENING FOR HPV (HUMAN PAPILLOMAVIRUS): ICD-10-CM

## 2025-05-15 DIAGNOSIS — Z12.4 PAP SMEAR FOR CERVICAL CANCER SCREENING: ICD-10-CM

## 2025-05-15 DIAGNOSIS — R87.612 LOW GRADE SQUAMOUS INTRAEPITHELIAL LESION ON CYTOLOGIC SMEAR OF CERVIX (LGSIL): ICD-10-CM

## 2025-05-15 DIAGNOSIS — Z01.419 WELL WOMAN EXAM: Primary | ICD-10-CM

## 2025-05-15 PROCEDURE — 99396 PREV VISIT EST AGE 40-64: CPT | Performed by: STUDENT IN AN ORGANIZED HEALTH CARE EDUCATION/TRAINING PROGRAM

## 2025-05-15 PROCEDURE — 99459 PELVIC EXAMINATION: CPT | Performed by: STUDENT IN AN ORGANIZED HEALTH CARE EDUCATION/TRAINING PROGRAM

## 2025-05-15 NOTE — PROGRESS NOTES
HPI:  Ms. West is a 45 y.o.  who is here today for a well woman exam. She complains of nothing.      Date Performed Result   PAP 4/15/24  Negative. HPV Neg.    Mammogram 24 Heterogeneously dense    Colonoscopy na na   Dexa na na     GYN History     LMP: 25  Cycle Length 28   Lasting 4  negative dysmenorrhea  negative postcoital bleeding      Past Medical History:  Past Medical History:   Diagnosis Date    Anxiety     Breast disorder     Adh-had lumpectomy    Ectopic pregnancy     Infertility, female -       Past Surgical History:  Past Surgical History:   Procedure Laterality Date    BREAST BIOPSY Right 2022    Stereotactic Biopsy    BREAST BIOPSY Right 2022    RIGHT BREAST NEEDLE LOCALIZED [place]OPSY [place] [facility] [place]    BREAST LUMPECTOMY Right 2022    RIGHT BREAST LUMPECTOMY/PARTIAL [place] [facility] [place]    BREAST LUMPECTOMY Right 2022    ADH    COLPOSCOPY      DILATION AND CURETTAGE  Ifeoma 10, 2022    SHAYLEE STEREO BREAST BX W LOC DEVICE 1ST LESION RIGHT Right 2022    [facility]E STEROTACTIC LOC BREAST BIOPSY RIGHT [place] [facility] [place]    SINUS SURGERY Bilateral        Allergies:   No Known Allergies    Medication History:  Current Outpatient Medications   Medication Sig Dispense Refill    escitalopram (LEXAPRO) 5 MG tablet Take 1 tablet by mouth daily 90 tablet 1    Multiple Vitamin (MULTIVITAMIN ADULT PO) Take by mouth      Emollient (COLLAGEN EX) Apply topically       No current facility-administered medications for this visit.       Social History:  Social History     Socioeconomic History    Marital status:      Spouse name: Not on file    Number of children: Not on file    Years of education: Not on file    Highest education level: Not on file   Occupational History    Not on file   Tobacco Use    Smoking status: Never    Smokeless tobacco: Never   Vaping Use    Vaping status:

## 2025-05-22 LAB
COLLECTION METHOD: NORMAL
CYTOLOGIST CVX/VAG CYTO: NORMAL
CYTOLOGY CVX/VAG DOC THIN PREP: NORMAL
DATE OF LMP: NORMAL
HPV APTIMA: NEGATIVE
Lab: NORMAL
PAP SOURCE: NORMAL
PATH REPORT.FINAL DX SPEC: NORMAL
PATHOLOGIST CVX/VAG CYTO: NORMAL
STAT OF ADQ CVX/VAG CYTO-IMP: NORMAL

## 2025-05-29 ENCOUNTER — RESULTS FOLLOW-UP (OUTPATIENT)
Dept: OBGYN CLINIC | Age: 45
End: 2025-05-29

## 2025-05-29 DIAGNOSIS — R87.612 LOW GRADE SQUAMOUS INTRAEPITHELIAL LESION ON CYTOLOGIC SMEAR OF CERVIX (LGSIL): Primary | ICD-10-CM

## 2025-06-09 ASSESSMENT — PATIENT HEALTH QUESTIONNAIRE - PHQ9
1. LITTLE INTEREST OR PLEASURE IN DOING THINGS: NOT AT ALL
SUM OF ALL RESPONSES TO PHQ QUESTIONS 1-9: 0
1. LITTLE INTEREST OR PLEASURE IN DOING THINGS: NOT AT ALL
SUM OF ALL RESPONSES TO PHQ QUESTIONS 1-9: 0
SUM OF ALL RESPONSES TO PHQ9 QUESTIONS 1 & 2: 0
2. FEELING DOWN, DEPRESSED OR HOPELESS: NOT AT ALL
2. FEELING DOWN, DEPRESSED OR HOPELESS: NOT AT ALL

## 2025-06-12 ENCOUNTER — OFFICE VISIT (OUTPATIENT)
Dept: INTERNAL MEDICINE CLINIC | Facility: CLINIC | Age: 45
End: 2025-06-12
Payer: COMMERCIAL

## 2025-06-12 VITALS
SYSTOLIC BLOOD PRESSURE: 130 MMHG | HEART RATE: 57 BPM | DIASTOLIC BLOOD PRESSURE: 78 MMHG | HEIGHT: 67 IN | WEIGHT: 139 LBS | OXYGEN SATURATION: 98 % | BODY MASS INDEX: 21.82 KG/M2

## 2025-06-12 DIAGNOSIS — F41.1 GENERALIZED ANXIETY DISORDER: ICD-10-CM

## 2025-06-12 PROCEDURE — 99214 OFFICE O/P EST MOD 30 MIN: CPT | Performed by: INTERNAL MEDICINE

## 2025-06-12 RX ORDER — ESCITALOPRAM OXALATE 5 MG/1
5 TABLET ORAL DAILY
Qty: 90 TABLET | Refills: 1 | Status: SHIPPED | OUTPATIENT
Start: 2025-06-12

## 2025-06-12 ASSESSMENT — ENCOUNTER SYMPTOMS
CONSTIPATION: 0
SHORTNESS OF BREATH: 0
COUGH: 0
ABDOMINAL DISTENTION: 0
CHEST TIGHTNESS: 0
ABDOMINAL PAIN: 0

## 2025-06-12 NOTE — PROGRESS NOTES
Recent labs:    No results found for: \"LABA1C\"     No results found for: \"CHOL\"  No results found for: \"TRIG\"  No results found for: \"HDL\"  No components found for: \"LDLCHOLESTEROL\", \"LDLCALC\"  No results found for: \"VLDL\"  No results found for: \"CHOLHDLRATIO\"  No results found for: \"TSH\", \"TSHFT4\", \"TSHELE\", \"FJB6MHW\", \"TSHHS\"      No results found for: \"NA\", \"K\", \"CL\", \"CO2\", \"BUN\", \"CREATININE\", \"GLUCOSE\", \"CALCIUM\", \"LABALBU\", \"BILITOT\", \"ALKPHOS\", \"AST\", \"ALT\", \"LABGLOM\", \"GFRAA\", \"AGRATIO\", \"GLOB\"    No results found for: \"WBC\", \"HGB\", \"HCT\", \"MCV\", \"PLT\"          This document was generated with the aid of voice recognition software.. Please be aware that there may be inadvertent transcription errors not identified and corrected by the author

## 2025-06-13 ENCOUNTER — HOSPITAL ENCOUNTER (OUTPATIENT)
Dept: MAMMOGRAPHY | Age: 45
Discharge: HOME | End: 2025-06-16
Payer: COMMERCIAL

## 2025-06-13 VITALS — HEIGHT: 68 IN | WEIGHT: 138 LBS | BODY MASS INDEX: 20.92 KG/M2

## 2025-06-13 DIAGNOSIS — Z12.31 SCREENING MAMMOGRAM FOR BREAST CANCER: ICD-10-CM

## 2025-06-13 DIAGNOSIS — Z80.3 FAMILY HISTORY OF BREAST CANCER: ICD-10-CM

## 2025-06-13 PROCEDURE — 77063 BREAST TOMOSYNTHESIS BI: CPT

## 2025-06-14 PROBLEM — Z01.419 WELL WOMAN EXAM: Status: RESOLVED | Noted: 2023-03-30 | Resolved: 2025-06-14

## 2025-07-07 ENCOUNTER — HOSPITAL ENCOUNTER (OUTPATIENT)
Dept: MAMMOGRAPHY | Age: 45
Discharge: HOME | End: 2025-07-10
Attending: STUDENT IN AN ORGANIZED HEALTH CARE EDUCATION/TRAINING PROGRAM
Payer: COMMERCIAL

## 2025-07-07 DIAGNOSIS — R92.8 ABNORMAL SCREENING MAMMOGRAM: ICD-10-CM

## 2025-07-07 PROCEDURE — G0279 TOMOSYNTHESIS, MAMMO: HCPCS

## (undated) DEVICE — CONTAINER,SPECIMEN,O.R.STRL,4.5OZ: Brand: MEDLINE

## (undated) DEVICE — STRIP,CLOSURE,WOUND,MEDI-STRIP,1/2X4: Brand: MEDLINE

## (undated) DEVICE — SUTURE VCRL SZ 4-0 L18IN ABSRB UD L19MM PS-2 3/8 CIR PRIM J496H

## (undated) DEVICE — 3M™ TEGADERM™ TRANSPARENT FILM DRESSING FRAME STYLE, 1626W, 4 IN X 4-3/4 IN (10 CM X 12 CM), 50/CT 4CT/CASE: Brand: 3M™ TEGADERM™

## (undated) DEVICE — CONTAINER COLL/TRNSPRT BX BRST -- E-Z-EM CAT 8390

## (undated) DEVICE — RETRACTOR SURG NARROW FLAT ULT LTWT ELEV TIP LO PROF

## (undated) DEVICE — TAPE DSG W4INXL11YD RETEN NONWOVEN LO SENS H2O RESIST

## (undated) DEVICE — NEEDLE HYPO 21GA L1.5IN INTRAMUSCULAR S STL LATCH BVL UP

## (undated) DEVICE — SOLUTION IRRIG 1000ML 0.9% SOD CHL USP POUR PLAS BTL

## (undated) DEVICE — PAD,NON-ADHERENT,3X8,STERILE,LF,1/PK: Brand: MEDLINE

## (undated) DEVICE — PREP SKN CHLRAPRP APL 26ML STR --

## (undated) DEVICE — CANISTER, RIGID, 2000CC: Brand: MEDLINE INDUSTRIES, INC.

## (undated) DEVICE — SUT SLK 3-0 30IN SH BLK --

## (undated) DEVICE — SPONGE GZ W6XL6IN COT 6 PLY SUP FLUF EXTRA ABSRB FOR PRE OP

## (undated) DEVICE — GARMENT,MEDLINE,DVT,INT,CALF,LG, GEN2: Brand: MEDLINE

## (undated) DEVICE — SUTURE VCRL SZ 3-0 L27IN ABSRB UD L26MM SH 1/2 CIR J416H

## (undated) DEVICE — RETRACTOR SURG WIDE FLAT LO PROF LTWT PHOTONGUIDE

## (undated) DEVICE — ILLUMINATOR ELECTROCAUTERY RETRACTED L8IN EXT L11IN DYN 10PK

## (undated) DEVICE — DRAPE,T,LAPARO,TRANS,STERILE: Brand: MEDLINE

## (undated) DEVICE — MASTISOL ADHESIVE LIQ 2/3ML

## (undated) DEVICE — WIRE CUTTER, STERILE (WCS144): Brand: CENTURION MEDICAL PRODUCTS CORP

## (undated) DEVICE — APPLIER LIG CLP M L11IN TI STR RNG HNDL FOR 20 CLP DISP

## (undated) DEVICE — MINOR SPLIT GENERAL: Brand: MEDLINE INDUSTRIES, INC.

## (undated) DEVICE — GLOVE SURG SZ 8 CRM LTX FREE POLYISOPRENE POLYMER BEAD ANTI